# Patient Record
Sex: FEMALE | Race: WHITE | NOT HISPANIC OR LATINO
[De-identification: names, ages, dates, MRNs, and addresses within clinical notes are randomized per-mention and may not be internally consistent; named-entity substitution may affect disease eponyms.]

---

## 2017-11-15 PROBLEM — Z00.00 ENCOUNTER FOR PREVENTIVE HEALTH EXAMINATION: Status: ACTIVE | Noted: 2017-11-15

## 2018-01-09 PROBLEM — Z00.00 ENCOUNTER FOR PREVENTIVE HEALTH EXAMINATION: Noted: 2018-01-09

## 2018-02-27 ENCOUNTER — APPOINTMENT (OUTPATIENT)
Dept: OBGYN | Facility: CLINIC | Age: 28
End: 2018-02-27
Payer: COMMERCIAL

## 2018-02-27 VITALS
WEIGHT: 133.5 LBS | HEIGHT: 59 IN | SYSTOLIC BLOOD PRESSURE: 110 MMHG | DIASTOLIC BLOOD PRESSURE: 80 MMHG | BODY MASS INDEX: 26.91 KG/M2

## 2018-02-27 DIAGNOSIS — Z80.3 FAMILY HISTORY OF MALIGNANT NEOPLASM OF BREAST: ICD-10-CM

## 2018-02-27 PROCEDURE — 99202 OFFICE O/P NEW SF 15 MIN: CPT

## 2018-03-02 ENCOUNTER — TRANSCRIPTION ENCOUNTER (OUTPATIENT)
Age: 28
End: 2018-03-02

## 2018-06-28 ENCOUNTER — APPOINTMENT (OUTPATIENT)
Dept: OBGYN | Facility: CLINIC | Age: 28
End: 2018-06-28
Payer: COMMERCIAL

## 2018-06-28 PROCEDURE — 36415 COLL VENOUS BLD VENIPUNCTURE: CPT

## 2018-06-29 LAB
ABO + RH PNL BLD: NORMAL
BLD GP AB SCN SERPL QL: NORMAL
HCG SERPL-MCNC: 23 MIU/ML
PROGEST SERPL-MCNC: 14 NG/ML

## 2018-07-10 ENCOUNTER — APPOINTMENT (OUTPATIENT)
Dept: OBGYN | Facility: CLINIC | Age: 28
End: 2018-07-10
Payer: COMMERCIAL

## 2018-07-10 PROCEDURE — 36415 COLL VENOUS BLD VENIPUNCTURE: CPT

## 2018-07-11 LAB
HCG SERPL-MCNC: 6090 MIU/ML
PROGEST SERPL-MCNC: 15 NG/ML

## 2018-07-18 ENCOUNTER — APPOINTMENT (OUTPATIENT)
Dept: OBGYN | Facility: CLINIC | Age: 28
End: 2018-07-18
Payer: COMMERCIAL

## 2018-07-18 VITALS
BODY MASS INDEX: 30.67 KG/M2 | DIASTOLIC BLOOD PRESSURE: 60 MMHG | HEIGHT: 59 IN | SYSTOLIC BLOOD PRESSURE: 100 MMHG | WEIGHT: 152.13 LBS

## 2018-07-18 PROCEDURE — 36415 COLL VENOUS BLD VENIPUNCTURE: CPT

## 2018-07-18 PROCEDURE — 0502F SUBSEQUENT PRENATAL CARE: CPT

## 2018-07-19 LAB
C TRACH RRNA SPEC QL NAA+PROBE: NOT DETECTED
HCG SERPL-MCNC: ABNORMAL MIU/ML
N GONORRHOEA RRNA SPEC QL NAA+PROBE: NOT DETECTED
PROGEST SERPL-MCNC: 11.5 NG/ML
SOURCE AMPLIFICATION: NORMAL

## 2018-07-24 ENCOUNTER — APPOINTMENT (OUTPATIENT)
Dept: OBGYN | Facility: CLINIC | Age: 28
End: 2018-07-24
Payer: COMMERCIAL

## 2018-07-24 VITALS
HEIGHT: 59 IN | DIASTOLIC BLOOD PRESSURE: 70 MMHG | SYSTOLIC BLOOD PRESSURE: 110 MMHG | WEIGHT: 152.25 LBS | BODY MASS INDEX: 30.69 KG/M2

## 2018-07-24 LAB — PAP TEST: NORMAL

## 2018-07-24 PROCEDURE — 0501F PRENATAL FLOW SHEET: CPT

## 2018-07-24 PROCEDURE — 36415 COLL VENOUS BLD VENIPUNCTURE: CPT

## 2018-07-25 LAB
BASOPHILS # BLD AUTO: 0.02 K/UL
BASOPHILS NFR BLD AUTO: 0.2 %
CMV IGG SERPL QL: <0.2 U/ML
CMV IGG SERPL-IMP: NEGATIVE
EOSINOPHIL # BLD AUTO: 0.08 K/UL
EOSINOPHIL NFR BLD AUTO: 1 %
HBV SURFACE AG SER QL: NONREACTIVE
HCT VFR BLD CALC: 41.2 %
HCV AB SER QL: NONREACTIVE
HCV S/CO RATIO: 0.16 S/CO
HGB BLD-MCNC: 13.7 G/DL
HIV1+2 AB SPEC QL IA.RAPID: NONREACTIVE
HSV 1+2 IGG SER IA-IMP: NEGATIVE
HSV 1+2 IGG SER IA-IMP: POSITIVE
HSV1 IGG SER QL: 33.9 INDEX
HSV2 IGG SER QL: 0.11 INDEX
IMM GRANULOCYTES NFR BLD AUTO: 0.5 %
LYMPHOCYTES # BLD AUTO: 2.01 K/UL
LYMPHOCYTES NFR BLD AUTO: 24.2 %
MAN DIFF?: NORMAL
MCHC RBC-ENTMCNC: 30.2 PG
MCHC RBC-ENTMCNC: 33.3 GM/DL
MCV RBC AUTO: 90.7 FL
MEV IGG FLD QL IA: 117 AU/ML
MEV IGG+IGM SER-IMP: POSITIVE
MONOCYTES # BLD AUTO: 0.53 K/UL
MONOCYTES NFR BLD AUTO: 6.4 %
NEUTROPHILS # BLD AUTO: 5.62 K/UL
NEUTROPHILS NFR BLD AUTO: 67.7 %
PLATELET # BLD AUTO: 361 K/UL
RBC # BLD: 4.54 M/UL
RBC # FLD: 13.4 %
RUBV IGG FLD-ACNC: 6.6 INDEX
RUBV IGG SER-IMP: POSITIVE
T GONDII AB SER-IMP: NEGATIVE
T GONDII AB SER-IMP: NEGATIVE
T GONDII IGG SER QL: <3 IU/ML
T GONDII IGM SER QL: <3 AU/ML
T PALLIDUM AB SER QL IA: NEGATIVE
TSH SERPL-ACNC: 2.58 UIU/ML
VZV AB TITR SER: POSITIVE
VZV IGG SER IF-ACNC: 221.1 INDEX
WBC # FLD AUTO: 8.3 K/UL

## 2018-07-26 LAB
ABO + RH PNL BLD: NORMAL
BLD GP AB SCN SERPL QL: NORMAL
CMV IGM SERPL QL: <8 AU/ML
CMV IGM SERPL QL: NEGATIVE
HGB A MFR BLD: 97.2 %
HGB A2 MFR BLD: 2.8 %
HGB FRACT BLD-IMP: NORMAL
RUBV IGM FLD-ACNC: <20 AU/ML

## 2018-07-27 LAB
B19V IGG SER QL IA: 6.9 INDEX
B19V IGG+IGM SER-IMP: NORMAL
B19V IGG+IGM SER-IMP: POSITIVE
B19V IGM FLD-ACNC: 0.1 INDEX
B19V IGM SER-ACNC: NEGATIVE
HSV1 IGM SER QL: NORMAL TITER
HSV2 AB FLD-ACNC: NORMAL TITER
MEV IGM SER QL: NEGATIVE
VZV IGM SER IF-ACNC: <0.91 INDEX

## 2018-08-07 ENCOUNTER — APPOINTMENT (OUTPATIENT)
Dept: OBGYN | Facility: CLINIC | Age: 28
End: 2018-08-07
Payer: COMMERCIAL

## 2018-08-07 VITALS
DIASTOLIC BLOOD PRESSURE: 70 MMHG | HEIGHT: 59 IN | WEIGHT: 153 LBS | BODY MASS INDEX: 30.84 KG/M2 | SYSTOLIC BLOOD PRESSURE: 110 MMHG

## 2018-08-07 PROCEDURE — 0502F SUBSEQUENT PRENATAL CARE: CPT

## 2018-08-14 ENCOUNTER — APPOINTMENT (OUTPATIENT)
Dept: OBGYN | Facility: CLINIC | Age: 28
End: 2018-08-14
Payer: COMMERCIAL

## 2018-08-14 PROCEDURE — 0502F SUBSEQUENT PRENATAL CARE: CPT

## 2018-08-14 PROCEDURE — 36415 COLL VENOUS BLD VENIPUNCTURE: CPT

## 2018-08-20 ENCOUNTER — NON-APPOINTMENT (OUTPATIENT)
Age: 28
End: 2018-08-20

## 2018-08-22 LAB
CLARI ADDITIONAL INFO: NORMAL
CLARI CHROMOSOME 13: NORMAL
CLARI CHROMOSOME 18: NORMAL
CLARI CHROMOSOME 21: NORMAL
CLARI SEX CHROMOSOMES: NORMAL
CLARITEST NIPT: NORMAL

## 2018-09-06 ENCOUNTER — APPOINTMENT (OUTPATIENT)
Dept: OBGYN | Facility: CLINIC | Age: 28
End: 2018-09-06
Payer: COMMERCIAL

## 2018-09-06 VITALS
WEIGHT: 154.13 LBS | BODY MASS INDEX: 31.07 KG/M2 | SYSTOLIC BLOOD PRESSURE: 110 MMHG | HEIGHT: 59 IN | DIASTOLIC BLOOD PRESSURE: 70 MMHG

## 2018-09-06 PROCEDURE — 0502F SUBSEQUENT PRENATAL CARE: CPT

## 2018-09-20 ENCOUNTER — APPOINTMENT (OUTPATIENT)
Dept: OBGYN | Facility: CLINIC | Age: 28
End: 2018-09-20
Payer: COMMERCIAL

## 2018-09-20 VITALS
SYSTOLIC BLOOD PRESSURE: 100 MMHG | DIASTOLIC BLOOD PRESSURE: 60 MMHG | BODY MASS INDEX: 31.04 KG/M2 | WEIGHT: 154 LBS | HEIGHT: 59 IN

## 2018-09-20 PROCEDURE — 36415 COLL VENOUS BLD VENIPUNCTURE: CPT

## 2018-09-20 PROCEDURE — 0502F SUBSEQUENT PRENATAL CARE: CPT

## 2018-10-01 LAB
1ST TRIMESTER DATA: NORMAL
2ND TRIMESTER DATA: NORMAL
ADDENDUM DOC: NORMAL
AFP PNL SERPL: NORMAL
AFP SERPL-ACNC: NORMAL
AFP SERPL-ACNC: NORMAL
B-HCG FREE SERPL-MCNC: NORMAL
BASOPHILS # BLD AUTO: 0.02 K/UL
BASOPHILS NFR BLD AUTO: 0.2 %
CLINICAL BIOCHEMIST REVIEW: NORMAL
EOSINOPHIL # BLD AUTO: 0.15 K/UL
EOSINOPHIL NFR BLD AUTO: 1.7 %
FREE BETA HCG 1ST TRIMESTER: NORMAL
HCT VFR BLD CALC: 36 %
HGB BLD-MCNC: 11.4 G/DL
IMM GRANULOCYTES NFR BLD AUTO: 0.6 %
INHIBIN A SERPL-MCNC: NORMAL
LYMPHOCYTES # BLD AUTO: 2.35 K/UL
LYMPHOCYTES NFR BLD AUTO: 26.2 %
MAN DIFF?: NORMAL
MCHC RBC-ENTMCNC: 28.7 PG
MCHC RBC-ENTMCNC: 31.7 GM/DL
MCV RBC AUTO: 90.7 FL
MONOCYTES # BLD AUTO: 0.45 K/UL
MONOCYTES NFR BLD AUTO: 5 %
NASAL BONE: PRESENT
NEUTROPHILS # BLD AUTO: 5.94 K/UL
NEUTROPHILS NFR BLD AUTO: 66.3 %
NOTES NTD: NORMAL
NT: NORMAL
PAPP-A SERPL-ACNC: NORMAL
PLATELET # BLD AUTO: 359 K/UL
RBC # BLD: 3.97 M/UL
RBC # FLD: 13.1 %
U ESTRIOL SERPL-SCNC: NORMAL
WBC # FLD AUTO: 8.96 K/UL

## 2018-10-11 ENCOUNTER — APPOINTMENT (OUTPATIENT)
Dept: OBGYN | Facility: CLINIC | Age: 28
End: 2018-10-11
Payer: COMMERCIAL

## 2018-10-11 VITALS
DIASTOLIC BLOOD PRESSURE: 70 MMHG | HEIGHT: 59 IN | BODY MASS INDEX: 31.28 KG/M2 | WEIGHT: 155.13 LBS | SYSTOLIC BLOOD PRESSURE: 110 MMHG

## 2018-10-11 PROCEDURE — 0502F SUBSEQUENT PRENATAL CARE: CPT

## 2018-11-08 ENCOUNTER — APPOINTMENT (OUTPATIENT)
Dept: OBGYN | Facility: CLINIC | Age: 28
End: 2018-11-08
Payer: COMMERCIAL

## 2018-11-08 VITALS
SYSTOLIC BLOOD PRESSURE: 120 MMHG | WEIGHT: 160.25 LBS | BODY MASS INDEX: 32.31 KG/M2 | DIASTOLIC BLOOD PRESSURE: 70 MMHG | HEIGHT: 59 IN

## 2018-11-08 PROCEDURE — 0502F SUBSEQUENT PRENATAL CARE: CPT

## 2018-11-28 ENCOUNTER — NON-APPOINTMENT (OUTPATIENT)
Age: 28
End: 2018-11-28

## 2018-11-28 ENCOUNTER — APPOINTMENT (OUTPATIENT)
Dept: OBGYN | Facility: CLINIC | Age: 28
End: 2018-11-28
Payer: COMMERCIAL

## 2018-11-28 VITALS
WEIGHT: 161.13 LBS | SYSTOLIC BLOOD PRESSURE: 120 MMHG | BODY MASS INDEX: 32.48 KG/M2 | HEIGHT: 59 IN | DIASTOLIC BLOOD PRESSURE: 70 MMHG

## 2018-11-28 PROCEDURE — 36415 COLL VENOUS BLD VENIPUNCTURE: CPT

## 2018-11-28 PROCEDURE — 0502F SUBSEQUENT PRENATAL CARE: CPT

## 2018-11-29 LAB
BASOPHILS # BLD AUTO: 0.01 K/UL
BASOPHILS NFR BLD AUTO: 0.1 %
EOSINOPHIL # BLD AUTO: 0.08 K/UL
EOSINOPHIL NFR BLD AUTO: 0.8 %
GLUCOSE 1H P 50 G GLC PO SERPL-MCNC: 109 MG/DL
HBA1C MFR BLD HPLC: 5 %
HCT VFR BLD CALC: 32.7 %
HGB BLD-MCNC: 10.4 G/DL
IMM GRANULOCYTES NFR BLD AUTO: 1.2 %
LYMPHOCYTES # BLD AUTO: 1.62 K/UL
LYMPHOCYTES NFR BLD AUTO: 17.2 %
MAN DIFF?: NORMAL
MCHC RBC-ENTMCNC: 28.5 PG
MCHC RBC-ENTMCNC: 31.8 GM/DL
MCV RBC AUTO: 89.6 FL
MONOCYTES # BLD AUTO: 0.53 K/UL
MONOCYTES NFR BLD AUTO: 5.6 %
NEUTROPHILS # BLD AUTO: 7.07 K/UL
NEUTROPHILS NFR BLD AUTO: 75.1 %
PLATELET # BLD AUTO: 360 K/UL
RBC # BLD: 3.65 M/UL
RBC # FLD: 13 %
WBC # FLD AUTO: 9.42 K/UL

## 2018-11-30 LAB — BACTERIA UR CULT: NORMAL

## 2018-12-19 ENCOUNTER — NON-APPOINTMENT (OUTPATIENT)
Age: 28
End: 2018-12-19

## 2018-12-19 ENCOUNTER — APPOINTMENT (OUTPATIENT)
Dept: OBGYN | Facility: CLINIC | Age: 28
End: 2018-12-19
Payer: COMMERCIAL

## 2018-12-19 VITALS
BODY MASS INDEX: 32.66 KG/M2 | SYSTOLIC BLOOD PRESSURE: 110 MMHG | HEIGHT: 59 IN | WEIGHT: 162 LBS | DIASTOLIC BLOOD PRESSURE: 70 MMHG

## 2018-12-19 PROCEDURE — 0502F SUBSEQUENT PRENATAL CARE: CPT

## 2019-01-02 ENCOUNTER — NON-APPOINTMENT (OUTPATIENT)
Age: 29
End: 2019-01-02

## 2019-01-02 ENCOUNTER — APPOINTMENT (OUTPATIENT)
Dept: OBGYN | Facility: CLINIC | Age: 29
End: 2019-01-02
Payer: COMMERCIAL

## 2019-01-02 VITALS
BODY MASS INDEX: 33.26 KG/M2 | SYSTOLIC BLOOD PRESSURE: 100 MMHG | HEIGHT: 59 IN | DIASTOLIC BLOOD PRESSURE: 60 MMHG | WEIGHT: 165 LBS

## 2019-01-02 PROCEDURE — 0502F SUBSEQUENT PRENATAL CARE: CPT

## 2019-01-17 ENCOUNTER — APPOINTMENT (OUTPATIENT)
Dept: OBGYN | Facility: CLINIC | Age: 29
End: 2019-01-17
Payer: COMMERCIAL

## 2019-01-17 VITALS
WEIGHT: 165 LBS | HEIGHT: 59 IN | DIASTOLIC BLOOD PRESSURE: 70 MMHG | BODY MASS INDEX: 33.26 KG/M2 | SYSTOLIC BLOOD PRESSURE: 110 MMHG

## 2019-01-17 PROCEDURE — 0502F SUBSEQUENT PRENATAL CARE: CPT

## 2019-01-29 ENCOUNTER — APPOINTMENT (OUTPATIENT)
Dept: OBGYN | Facility: CLINIC | Age: 29
End: 2019-01-29
Payer: COMMERCIAL

## 2019-01-29 VITALS
DIASTOLIC BLOOD PRESSURE: 70 MMHG | WEIGHT: 166 LBS | BODY MASS INDEX: 33.47 KG/M2 | SYSTOLIC BLOOD PRESSURE: 120 MMHG | HEIGHT: 59 IN

## 2019-01-29 PROCEDURE — 0502F SUBSEQUENT PRENATAL CARE: CPT

## 2019-02-13 ENCOUNTER — APPOINTMENT (OUTPATIENT)
Dept: OBGYN | Facility: CLINIC | Age: 29
End: 2019-02-13
Payer: COMMERCIAL

## 2019-02-13 ENCOUNTER — NON-APPOINTMENT (OUTPATIENT)
Age: 29
End: 2019-02-13

## 2019-02-13 VITALS
DIASTOLIC BLOOD PRESSURE: 80 MMHG | HEIGHT: 59 IN | SYSTOLIC BLOOD PRESSURE: 110 MMHG | BODY MASS INDEX: 34.07 KG/M2 | WEIGHT: 169 LBS

## 2019-02-13 PROCEDURE — 0502F SUBSEQUENT PRENATAL CARE: CPT

## 2019-02-15 LAB
BACTERIA UR CULT: NORMAL
GP B STREP DNA SPEC QL NAA+PROBE: NORMAL
GP B STREP DNA SPEC QL NAA+PROBE: NOT DETECTED
SOURCE GBS: NORMAL

## 2019-02-19 ENCOUNTER — APPOINTMENT (OUTPATIENT)
Dept: OBGYN | Facility: CLINIC | Age: 29
End: 2019-02-19
Payer: COMMERCIAL

## 2019-02-19 VITALS
HEIGHT: 59 IN | DIASTOLIC BLOOD PRESSURE: 80 MMHG | SYSTOLIC BLOOD PRESSURE: 110 MMHG | WEIGHT: 169 LBS | BODY MASS INDEX: 34.07 KG/M2

## 2019-02-19 PROCEDURE — 0502F SUBSEQUENT PRENATAL CARE: CPT

## 2019-02-24 ENCOUNTER — OUTPATIENT (OUTPATIENT)
Dept: OUTPATIENT SERVICES | Facility: HOSPITAL | Age: 29
LOS: 1 days | End: 2019-02-24
Payer: COMMERCIAL

## 2019-02-24 DIAGNOSIS — Z3A.00 WEEKS OF GESTATION OF PREGNANCY NOT SPECIFIED: ICD-10-CM

## 2019-02-24 DIAGNOSIS — O26.899 OTHER SPECIFIED PREGNANCY RELATED CONDITIONS, UNSPECIFIED TRIMESTER: ICD-10-CM

## 2019-02-26 ENCOUNTER — APPOINTMENT (OUTPATIENT)
Dept: OBGYN | Facility: CLINIC | Age: 29
End: 2019-02-26
Payer: COMMERCIAL

## 2019-02-26 VITALS
DIASTOLIC BLOOD PRESSURE: 70 MMHG | SYSTOLIC BLOOD PRESSURE: 110 MMHG | BODY MASS INDEX: 34.47 KG/M2 | HEIGHT: 59 IN | WEIGHT: 171 LBS

## 2019-02-26 PROCEDURE — 0502F SUBSEQUENT PRENATAL CARE: CPT

## 2019-02-28 ENCOUNTER — INPATIENT (INPATIENT)
Facility: HOSPITAL | Age: 29
LOS: 2 days | Discharge: HOME | End: 2019-03-03
Attending: OBSTETRICS & GYNECOLOGY | Admitting: OBSTETRICS & GYNECOLOGY
Payer: COMMERCIAL

## 2019-02-28 ENCOUNTER — RESULT REVIEW (OUTPATIENT)
Age: 29
End: 2019-02-28

## 2019-02-28 VITALS — WEIGHT: 168.65 LBS | HEIGHT: 58 IN

## 2019-02-28 DIAGNOSIS — O26.899 OTHER SPECIFIED PREGNANCY RELATED CONDITIONS, UNSPECIFIED TRIMESTER: ICD-10-CM

## 2019-02-28 DIAGNOSIS — Z3A.00 WEEKS OF GESTATION OF PREGNANCY NOT SPECIFIED: ICD-10-CM

## 2019-02-28 DIAGNOSIS — O09.613 SUPERVISION OF YOUNG PRIMIGRAVIDA, THIRD TRIMESTER: ICD-10-CM

## 2019-02-28 LAB
BASOPHILS # BLD AUTO: 0.03 K/UL — SIGNIFICANT CHANGE UP (ref 0–0.2)
BASOPHILS NFR BLD AUTO: 0.3 % — SIGNIFICANT CHANGE UP (ref 0–2)
BLD GP AB SCN SERPL QL: NEGATIVE — SIGNIFICANT CHANGE UP
EOSINOPHIL # BLD AUTO: 0.12 K/UL — SIGNIFICANT CHANGE UP (ref 0–0.5)
EOSINOPHIL NFR BLD AUTO: 1 % — SIGNIFICANT CHANGE UP (ref 0–6)
HCT VFR BLD CALC: 37 % — SIGNIFICANT CHANGE UP (ref 34.5–45)
HGB BLD-MCNC: 12.5 G/DL — SIGNIFICANT CHANGE UP (ref 11.5–15.5)
IMM GRANULOCYTES NFR BLD AUTO: 1.9 % — HIGH (ref 0–1.5)
LYMPHOCYTES # BLD AUTO: 2.35 K/UL — SIGNIFICANT CHANGE UP (ref 1–3.3)
LYMPHOCYTES # BLD AUTO: 20.1 % — SIGNIFICANT CHANGE UP (ref 13–44)
MCHC RBC-ENTMCNC: 29.3 PG — SIGNIFICANT CHANGE UP (ref 27–34)
MCHC RBC-ENTMCNC: 33.8 GM/DL — SIGNIFICANT CHANGE UP (ref 32–36)
MCV RBC AUTO: 86.7 FL — SIGNIFICANT CHANGE UP (ref 80–100)
MONOCYTES # BLD AUTO: 0.88 K/UL — SIGNIFICANT CHANGE UP (ref 0–0.9)
MONOCYTES NFR BLD AUTO: 7.5 % — SIGNIFICANT CHANGE UP (ref 2–14)
NEUTROPHILS # BLD AUTO: 8.07 K/UL — HIGH (ref 1.8–7.4)
NEUTROPHILS NFR BLD AUTO: 69.2 % — SIGNIFICANT CHANGE UP (ref 43–77)
NRBC # BLD: 0 /100 WBCS — SIGNIFICANT CHANGE UP (ref 0–0)
PLATELET # BLD AUTO: 332 K/UL — SIGNIFICANT CHANGE UP (ref 150–400)
RBC # BLD: 4.27 M/UL — SIGNIFICANT CHANGE UP (ref 3.8–5.2)
RBC # FLD: 13.8 % — SIGNIFICANT CHANGE UP (ref 10.3–14.5)
RH IG SCN BLD-IMP: POSITIVE — SIGNIFICANT CHANGE UP
T PALLIDUM AB TITR SER: NEGATIVE — SIGNIFICANT CHANGE UP
WBC # BLD: 11.67 K/UL — HIGH (ref 3.8–10.5)
WBC # FLD AUTO: 11.67 K/UL — HIGH (ref 3.8–10.5)

## 2019-02-28 PROCEDURE — 59510 CESAREAN DELIVERY: CPT

## 2019-02-28 RX ORDER — OXYTOCIN 10 UNIT/ML
333.33 VIAL (ML) INJECTION
Qty: 20 | Refills: 0 | Status: DISCONTINUED | OUTPATIENT
Start: 2019-03-01 | End: 2019-03-03

## 2019-02-28 RX ORDER — GLYCERIN ADULT
1 SUPPOSITORY, RECTAL RECTAL AT BEDTIME
Refills: 0 | Status: DISCONTINUED | OUTPATIENT
Start: 2019-03-01 | End: 2019-03-03

## 2019-02-28 RX ORDER — LANOLIN
1 OINTMENT (GRAM) TOPICAL
Refills: 0 | Status: DISCONTINUED | OUTPATIENT
Start: 2019-03-01 | End: 2019-03-03

## 2019-02-28 RX ORDER — HEPARIN SODIUM 5000 [USP'U]/ML
5000 INJECTION INTRAVENOUS; SUBCUTANEOUS EVERY 12 HOURS
Refills: 0 | Status: DISCONTINUED | OUTPATIENT
Start: 2019-03-01 | End: 2019-03-03

## 2019-02-28 RX ORDER — DIPHENHYDRAMINE HCL 50 MG
25 CAPSULE ORAL EVERY 6 HOURS
Refills: 0 | Status: DISCONTINUED | OUTPATIENT
Start: 2019-03-01 | End: 2019-03-03

## 2019-02-28 RX ORDER — NALOXONE HYDROCHLORIDE 4 MG/.1ML
0.1 SPRAY NASAL
Refills: 0 | Status: DISCONTINUED | OUTPATIENT
Start: 2019-03-01 | End: 2019-03-03

## 2019-02-28 RX ORDER — ONDANSETRON 8 MG/1
4 TABLET, FILM COATED ORAL EVERY 6 HOURS
Refills: 0 | Status: DISCONTINUED | OUTPATIENT
Start: 2019-03-01 | End: 2019-03-01

## 2019-02-28 RX ORDER — SODIUM CHLORIDE 9 MG/ML
1000 INJECTION, SOLUTION INTRAVENOUS
Refills: 0 | Status: DISCONTINUED | OUTPATIENT
Start: 2019-03-01 | End: 2019-03-03

## 2019-02-28 RX ORDER — TETANUS TOXOID, REDUCED DIPHTHERIA TOXOID AND ACELLULAR PERTUSSIS VACCINE, ADSORBED 5; 2.5; 8; 8; 2.5 [IU]/.5ML; [IU]/.5ML; UG/.5ML; UG/.5ML; UG/.5ML
0.5 SUSPENSION INTRAMUSCULAR ONCE
Refills: 0 | Status: COMPLETED | OUTPATIENT
Start: 2019-03-01

## 2019-02-28 RX ORDER — ACETAMINOPHEN 500 MG
650 TABLET ORAL EVERY 6 HOURS
Refills: 0 | Status: DISCONTINUED | OUTPATIENT
Start: 2019-03-01 | End: 2019-03-03

## 2019-02-28 RX ORDER — CITRIC ACID/SODIUM CITRATE 300-500 MG
15 SOLUTION, ORAL ORAL EVERY 4 HOURS
Refills: 0 | Status: DISCONTINUED | OUTPATIENT
Start: 2019-02-28 | End: 2019-02-28

## 2019-02-28 RX ORDER — AZITHROMYCIN 500 MG/1
500 TABLET, FILM COATED ORAL ONCE
Refills: 0 | Status: DISCONTINUED | OUTPATIENT
Start: 2019-02-28 | End: 2019-02-28

## 2019-02-28 RX ORDER — OXYTOCIN 10 UNIT/ML
333.33 VIAL (ML) INJECTION
Qty: 20 | Refills: 0 | Status: DISCONTINUED | OUTPATIENT
Start: 2019-02-28 | End: 2019-02-28

## 2019-02-28 RX ORDER — OXYCODONE AND ACETAMINOPHEN 5; 325 MG/1; MG/1
1 TABLET ORAL
Refills: 0 | Status: DISCONTINUED | OUTPATIENT
Start: 2019-03-01 | End: 2019-03-03

## 2019-02-28 RX ORDER — SODIUM CHLORIDE 9 MG/ML
1000 INJECTION, SOLUTION INTRAVENOUS ONCE
Refills: 0 | Status: DISCONTINUED | OUTPATIENT
Start: 2019-02-28 | End: 2019-02-28

## 2019-02-28 RX ORDER — SODIUM CHLORIDE 9 MG/ML
1000 INJECTION, SOLUTION INTRAVENOUS
Refills: 0 | Status: DISCONTINUED | OUTPATIENT
Start: 2019-02-28 | End: 2019-02-28

## 2019-02-28 RX ORDER — KETOROLAC TROMETHAMINE 30 MG/ML
15 SYRINGE (ML) INJECTION ONCE
Refills: 0 | Status: DISCONTINUED | OUTPATIENT
Start: 2019-03-01 | End: 2019-03-03

## 2019-02-28 RX ORDER — HYDROMORPHONE HYDROCHLORIDE 2 MG/ML
1 INJECTION INTRAMUSCULAR; INTRAVENOUS; SUBCUTANEOUS ONCE
Refills: 0 | Status: DISCONTINUED | OUTPATIENT
Start: 2019-03-01 | End: 2019-03-03

## 2019-02-28 RX ORDER — CEFAZOLIN SODIUM 1 G
2000 VIAL (EA) INJECTION ONCE
Refills: 0 | Status: COMPLETED | OUTPATIENT
Start: 2019-02-28 | End: 2019-02-28

## 2019-02-28 RX ORDER — OXYTOCIN 10 UNIT/ML
1 VIAL (ML) INJECTION
Qty: 30 | Refills: 0 | Status: DISCONTINUED | OUTPATIENT
Start: 2019-02-28 | End: 2019-02-28

## 2019-02-28 RX ORDER — OXYTOCIN 10 UNIT/ML
41.67 VIAL (ML) INJECTION
Qty: 20 | Refills: 0 | Status: DISCONTINUED | OUTPATIENT
Start: 2019-03-01 | End: 2019-03-03

## 2019-02-28 RX ORDER — SIMETHICONE 80 MG/1
80 TABLET, CHEWABLE ORAL EVERY 4 HOURS
Refills: 0 | Status: DISCONTINUED | OUTPATIENT
Start: 2019-03-01 | End: 2019-03-03

## 2019-02-28 RX ORDER — FERROUS SULFATE 325(65) MG
325 TABLET ORAL DAILY
Refills: 0 | Status: DISCONTINUED | OUTPATIENT
Start: 2019-03-01 | End: 2019-03-03

## 2019-02-28 RX ORDER — DOCUSATE SODIUM 100 MG
100 CAPSULE ORAL
Refills: 0 | Status: DISCONTINUED | OUTPATIENT
Start: 2019-03-01 | End: 2019-03-03

## 2019-02-28 RX ORDER — IBUPROFEN 200 MG
600 TABLET ORAL EVERY 6 HOURS
Refills: 0 | Status: DISCONTINUED | OUTPATIENT
Start: 2019-03-01 | End: 2019-03-03

## 2019-02-28 RX ORDER — OXYCODONE AND ACETAMINOPHEN 5; 325 MG/1; MG/1
2 TABLET ORAL EVERY 6 HOURS
Refills: 0 | Status: DISCONTINUED | OUTPATIENT
Start: 2019-03-01 | End: 2019-03-03

## 2019-02-28 RX ORDER — FENTANYL/BUPIVACAINE/NS/PF 2MCG/ML-.1
250 PLASTIC BAG, INJECTION (ML) INJECTION
Refills: 0 | Status: DISCONTINUED | OUTPATIENT
Start: 2019-02-28 | End: 2019-02-28

## 2019-02-28 RX ORDER — ONDANSETRON 8 MG/1
4 TABLET, FILM COATED ORAL EVERY 6 HOURS
Refills: 0 | Status: DISCONTINUED | OUTPATIENT
Start: 2019-03-01 | End: 2019-03-03

## 2019-02-28 RX ADMIN — Medication 15 MILLILITER(S): at 19:14

## 2019-02-28 RX ADMIN — Medication 100 MILLIGRAM(S): at 19:14

## 2019-02-28 RX ADMIN — Medication 1 MILLIUNIT(S)/MIN: at 08:56

## 2019-02-28 RX ADMIN — SODIUM CHLORIDE 125 MILLILITER(S): 9 INJECTION, SOLUTION INTRAVENOUS at 08:56

## 2019-03-01 LAB
HCT VFR BLD CALC: 30.1 % — LOW (ref 34.5–45)
HGB BLD-MCNC: 10 G/DL — LOW (ref 11.5–15.5)
MCHC RBC-ENTMCNC: 29.2 PG — SIGNIFICANT CHANGE UP (ref 27–34)
MCHC RBC-ENTMCNC: 33.2 GM/DL — SIGNIFICANT CHANGE UP (ref 32–36)
MCV RBC AUTO: 88 FL — SIGNIFICANT CHANGE UP (ref 80–100)
NRBC # BLD: 0 /100 WBCS — SIGNIFICANT CHANGE UP (ref 0–0)
PLATELET # BLD AUTO: 268 K/UL — SIGNIFICANT CHANGE UP (ref 150–400)
RBC # BLD: 3.42 M/UL — LOW (ref 3.8–5.2)
RBC # FLD: 13.8 % — SIGNIFICANT CHANGE UP (ref 10.3–14.5)
WBC # BLD: 20.43 K/UL — HIGH (ref 3.8–10.5)
WBC # FLD AUTO: 20.43 K/UL — HIGH (ref 3.8–10.5)

## 2019-03-01 RX ORDER — ALBUTEROL 90 UG/1
2 AEROSOL, METERED ORAL EVERY 6 HOURS
Refills: 0 | Status: DISCONTINUED | OUTPATIENT
Start: 2019-03-01 | End: 2019-03-01

## 2019-03-01 RX ORDER — ALBUTEROL 90 UG/1
2 AEROSOL, METERED ORAL EVERY 6 HOURS
Refills: 0 | Status: DISCONTINUED | OUTPATIENT
Start: 2019-03-01 | End: 2019-03-03

## 2019-03-01 RX ADMIN — SIMETHICONE 80 MILLIGRAM(S): 80 TABLET, CHEWABLE ORAL at 12:31

## 2019-03-01 RX ADMIN — SIMETHICONE 80 MILLIGRAM(S): 80 TABLET, CHEWABLE ORAL at 06:44

## 2019-03-01 RX ADMIN — HEPARIN SODIUM 5000 UNIT(S): 5000 INJECTION INTRAVENOUS; SUBCUTANEOUS at 12:30

## 2019-03-01 RX ADMIN — ALBUTEROL 2 PUFF(S): 90 AEROSOL, METERED ORAL at 18:39

## 2019-03-01 RX ADMIN — Medication 600 MILLIGRAM(S): at 12:31

## 2019-03-01 RX ADMIN — Medication 600 MILLIGRAM(S): at 06:44

## 2019-03-01 RX ADMIN — SIMETHICONE 80 MILLIGRAM(S): 80 TABLET, CHEWABLE ORAL at 22:32

## 2019-03-01 RX ADMIN — Medication 1 TABLET(S): at 12:31

## 2019-03-01 RX ADMIN — Medication 100 MILLIGRAM(S): at 12:31

## 2019-03-01 RX ADMIN — Medication 650 MILLIGRAM(S): at 23:00

## 2019-03-01 RX ADMIN — Medication 650 MILLIGRAM(S): at 22:27

## 2019-03-01 RX ADMIN — Medication 100 MILLIGRAM(S): at 06:44

## 2019-03-01 RX ADMIN — Medication 325 MILLIGRAM(S): at 12:42

## 2019-03-01 RX ADMIN — SIMETHICONE 80 MILLIGRAM(S): 80 TABLET, CHEWABLE ORAL at 17:40

## 2019-03-01 RX ADMIN — Medication 600 MILLIGRAM(S): at 13:00

## 2019-03-01 RX ADMIN — Medication 600 MILLIGRAM(S): at 07:52

## 2019-03-01 RX ADMIN — Medication 600 MILLIGRAM(S): at 17:41

## 2019-03-01 NOTE — LACTATION INITIAL EVALUATION - PRO FEM REPRO BREAST PUMP YN
advised mother to pump 15-20min q2-3h due to formula supplementation and h/o breast reduction surgery/yes

## 2019-03-01 NOTE — PROVIDER CONTACT NOTE (OTHER) - ASSESSMENT
pt Respirations  = 22; pt felt better with incentive spirometer; symptom reappeared half an hour after incentive spirometer completed

## 2019-03-01 NOTE — LACTATION INITIAL EVALUATION - NS LACT CON REASON FOR REQ
16hr old  2stools/DTV 2.9% wt loss. baby was formula fed overnight and this morning however mother states she wishes to breastfeed. mother will be putting baby to breast for the first time. placed baby s2s and noted early feeding cues. assisted mom with a football hold. with full assist baby sustained a deep latch on R breast for approx 5minutes with slow, rhythmic suck and visible swallowing. mom initially c/o intense stretching which she verbalized discomfort with but after a few minutes, she said it no longer felt uncomfortable. offered L breast, baby was sleepy and did not attach right away. mom stated she wanted to stop trying. mom has h/o breast reduction surgery with lollipop incision. reports nipple sensitivity and significant breast growth in pregnancy. colostrum easily expressible from both sides. discussed pumping for stimulation 15-20min q2-3h, pump at bedside primary nurse to provided instruction. provided with [email or web address] as a resource and advised f/u with ibclc. also discussed herbal galactogogues. reviewed bfing basics, positioning techniques, feeding/satiety cues, signs of good latch, and encouraged s2s contact. taught hand expression with return demo. advised responsive feeding 8-12x/day./primaparous mom

## 2019-03-01 NOTE — PROGRESS NOTE ADULT - ASSESSMENT
28y Female POD# 1   s/p C/S, Uncomplicated                                       1. Neuro/Pain:  OPM  2  CV:  VS per routine  3. Pulm: Encourage ISS & Ambulation  4. GI:  Reg  5. : TOV  6. DVT ppx: SCDs, SQH 5000 mg BID  7. Dispo: POD #3 or #4

## 2019-03-01 NOTE — PROGRESS NOTE ADULT - SUBJECTIVE AND OBJECTIVE BOX
Patient evaluated at bedside.   She reports pain is well controlled.  Campo in place  No Flatus  Not OOB yet.    She denies HA, dizziness, CP, palpitations, SOB, n/v, or heavy vaginal bleeding.    Physical Exam:  Vital Signs Last 24 Hrs  T(C): 37.1 (01 Mar 2019 06:00), Max: 37.1 (01 Mar 2019 06:00)  T(F): 98.7 (01 Mar 2019 06:00), Max: 98.7 (01 Mar 2019 06:00)  HR: 71 (01 Mar 2019 06:00) (71 - 95)  BP: 93/58 (01 Mar 2019 06:00) (93/58 - 119/57)  BP(mean): --  RR: 17 (01 Mar 2019 06:00) (16 - 18)  SpO2: 97% (01 Mar 2019 06:00) (95% - 98%)    Gen: NAD  Abd: + BS, soft, nontender, nondistended, no rebound or guarding  Incision clean, dry and intact  uterus firm at midline  : lochia WNL  Extremities: no swelling or calf tenderness                          10.0   20.43 )-----------( 268      ( 01 Mar 2019 06:09 )             30.1     MEDICATIONS  (STANDING):  diphtheria/tetanus/pertussis (acellular) Vaccine (ADAcel) 0.5 milliLiter(s) IntraMuscular once  ferrous    sulfate 325 milliGRAM(s) Oral daily  heparin  Injectable 5000 Unit(s) SubCutaneous every 12 hours  ibuprofen  Tablet. 600 milliGRAM(s) Oral every 6 hours  ketorolac   Injectable 15 milliGRAM(s) IV Push once  lactated ringers. 1000 milliLiter(s) (125 mL/Hr) IV Continuous <Continuous>  lactated ringers. 1000 milliLiter(s) (125 mL/Hr) IV Continuous <Continuous>  oxytocin Infusion 41.667 milliUNIT(s)/Min (125 mL/Hr) IV Continuous <Continuous>  oxytocin Infusion 333.333 milliUNIT(s)/Min (1000 mL/Hr) IV Continuous <Continuous>  oxytocin Infusion 41.667 milliUNIT(s)/Min (125 mL/Hr) IV Continuous <Continuous>  prenatal multivitamin 1 Tablet(s) Oral daily    MEDICATIONS  (PRN):  acetaminophen   Tablet .. 650 milliGRAM(s) Oral every 6 hours PRN Temp greater or equal to 38.5C (101.3F), Mild Pain (1 - 3)  diphenhydrAMINE 25 milliGRAM(s) Oral every 6 hours PRN Itching  docusate sodium 100 milliGRAM(s) Oral two times a day PRN Stool Softening  glycerin Suppository - Adult 1 Suppository(s) Rectal at bedtime PRN Constipation  HYDROmorphone  Injectable 1 milliGRAM(s) IV Push once PRN Severe Pain (7 - 10)  lanolin Ointment 1 Application(s) Topical every 3 hours PRN Sore Nipples  naloxone Injectable 0.1 milliGRAM(s) IV Push every 3 minutes PRN For ANY of the following changes in patient status:  A. RR LESS THAN 10 breaths per minute, B. Oxygen saturation LESS THAN 90%, C. Sedation score of 6  naloxone Injectable 0.1 milliGRAM(s) IV Push every 3 minutes PRN For ANY of the following changes in patient status:  A. RR LESS THAN 10 breaths per minute, B. Oxygen saturation LESS THAN 90%, C. Sedation score of 6  ondansetron Injectable 4 milliGRAM(s) IV Push every 6 hours PRN Nausea  oxyCODONE    5 mG/acetaminophen 325 mG 1 Tablet(s) Oral every 3 hours PRN Moderate Pain (4 - 6)  oxyCODONE    5 mG/acetaminophen 325 mG 2 Tablet(s) Oral every 6 hours PRN Severe Pain (7 - 10)  simethicone 80 milliGRAM(s) Chew every 4 hours PRN Gas

## 2019-03-02 RX ADMIN — Medication 600 MILLIGRAM(S): at 05:56

## 2019-03-02 RX ADMIN — Medication 100 MILLIGRAM(S): at 00:20

## 2019-03-02 RX ADMIN — Medication 100 MILLIGRAM(S): at 11:26

## 2019-03-02 RX ADMIN — OXYCODONE AND ACETAMINOPHEN 2 TABLET(S): 5; 325 TABLET ORAL at 11:26

## 2019-03-02 RX ADMIN — Medication 600 MILLIGRAM(S): at 00:20

## 2019-03-02 RX ADMIN — Medication 1 TABLET(S): at 11:25

## 2019-03-02 RX ADMIN — Medication 600 MILLIGRAM(S): at 06:48

## 2019-03-02 RX ADMIN — Medication 600 MILLIGRAM(S): at 15:56

## 2019-03-02 RX ADMIN — Medication 600 MILLIGRAM(S): at 01:00

## 2019-03-02 RX ADMIN — Medication 650 MILLIGRAM(S): at 18:40

## 2019-03-02 RX ADMIN — Medication 650 MILLIGRAM(S): at 18:41

## 2019-03-02 RX ADMIN — OXYCODONE AND ACETAMINOPHEN 2 TABLET(S): 5; 325 TABLET ORAL at 04:55

## 2019-03-02 RX ADMIN — HEPARIN SODIUM 5000 UNIT(S): 5000 INJECTION INTRAVENOUS; SUBCUTANEOUS at 11:27

## 2019-03-02 RX ADMIN — Medication 600 MILLIGRAM(S): at 16:00

## 2019-03-02 RX ADMIN — OXYCODONE AND ACETAMINOPHEN 2 TABLET(S): 5; 325 TABLET ORAL at 23:14

## 2019-03-02 RX ADMIN — ONDANSETRON 4 MILLIGRAM(S): 8 TABLET, FILM COATED ORAL at 03:51

## 2019-03-02 RX ADMIN — OXYCODONE AND ACETAMINOPHEN 2 TABLET(S): 5; 325 TABLET ORAL at 04:24

## 2019-03-02 RX ADMIN — HEPARIN SODIUM 5000 UNIT(S): 5000 INJECTION INTRAVENOUS; SUBCUTANEOUS at 00:20

## 2019-03-02 RX ADMIN — Medication 325 MILLIGRAM(S): at 11:25

## 2019-03-02 NOTE — PROGRESS NOTE ADULT - SUBJECTIVE AND OBJECTIVE BOX
Patient evaluated at bedside.   She reports pain is well controlled with motrin and tyleonl  She has been ambulating without assistance, voiding spontaneously, passing gas, tolerating regular diet and is breastfeeding.    She denies HA, dizziness, CP, palpitations, SOB, n/v, or heavy vaginal bleeding.    Physical Exam:  Vital Signs Last 24 Hrs  T(C): 36.7 (02 Mar 2019 02:00), Max: 36.7 (01 Mar 2019 10:00)  T(F): 98.1 (02 Mar 2019 02:00), Max: 98.1 (01 Mar 2019 10:00)  HR: 80 (02 Mar 2019 02:00) (80 - 90)  BP: 95/61 (02 Mar 2019 02:00) (89/60 - 103/68)  BP(mean): --  RR: 17 (02 Mar 2019 02:00) (17 - 18)  SpO2: 97% (02 Mar 2019 02:00) (97% - 100%)    Gen: NAD  Abd: + BS, soft, nontender, nondistended, no rebound or guarding  Incision clean, dry and intact  uterus firm at midline  : lochia WNL  Extremities: no swelling or calf tenderness                          10.0   20.43 )-----------( 268      ( 01 Mar 2019 06:09 )             30.1     MEDICATIONS  (STANDING):  diphtheria/tetanus/pertussis (acellular) Vaccine (ADAcel) 0.5 milliLiter(s) IntraMuscular once  ferrous    sulfate 325 milliGRAM(s) Oral daily  heparin  Injectable 5000 Unit(s) SubCutaneous every 12 hours  ibuprofen  Tablet. 600 milliGRAM(s) Oral every 6 hours  ketorolac   Injectable 15 milliGRAM(s) IV Push once  lactated ringers. 1000 milliLiter(s) (125 mL/Hr) IV Continuous <Continuous>  lactated ringers. 1000 milliLiter(s) (125 mL/Hr) IV Continuous <Continuous>  oxytocin Infusion 41.667 milliUNIT(s)/Min (125 mL/Hr) IV Continuous <Continuous>  oxytocin Infusion 333.333 milliUNIT(s)/Min (1000 mL/Hr) IV Continuous <Continuous>  oxytocin Infusion 41.667 milliUNIT(s)/Min (125 mL/Hr) IV Continuous <Continuous>  prenatal multivitamin 1 Tablet(s) Oral daily    MEDICATIONS  (PRN):  acetaminophen   Tablet .. 650 milliGRAM(s) Oral every 6 hours PRN Temp greater or equal to 38.5C (101.3F), Mild Pain (1 - 3)  ALBUTerol    90 MICROgram(s) HFA Inhaler 2 Puff(s) Inhalation every 6 hours PRN Shortness of Breath and/or Wheezing  diphenhydrAMINE 25 milliGRAM(s) Oral every 6 hours PRN Itching  docusate sodium 100 milliGRAM(s) Oral two times a day PRN Stool Softening  glycerin Suppository - Adult 1 Suppository(s) Rectal at bedtime PRN Constipation  HYDROmorphone  Injectable 1 milliGRAM(s) IV Push once PRN Severe Pain (7 - 10)  lanolin Ointment 1 Application(s) Topical every 3 hours PRN Sore Nipples  naloxone Injectable 0.1 milliGRAM(s) IV Push every 3 minutes PRN For ANY of the following changes in patient status:  A. RR LESS THAN 10 breaths per minute, B. Oxygen saturation LESS THAN 90%, C. Sedation score of 6  naloxone Injectable 0.1 milliGRAM(s) IV Push every 3 minutes PRN For ANY of the following changes in patient status:  A. RR LESS THAN 10 breaths per minute, B. Oxygen saturation LESS THAN 90%, C. Sedation score of 6  ondansetron Injectable 4 milliGRAM(s) IV Push every 6 hours PRN Nausea  oxyCODONE    5 mG/acetaminophen 325 mG 1 Tablet(s) Oral every 3 hours PRN Moderate Pain (4 - 6)  oxyCODONE    5 mG/acetaminophen 325 mG 2 Tablet(s) Oral every 6 hours PRN Severe Pain (7 - 10)  simethicone 80 milliGRAM(s) Chew every 4 hours PRN Gas

## 2019-03-02 NOTE — PROGRESS NOTE ADULT - ASSESSMENT
28y Female POD# 2   s/p C/S, Uncomplicated                                       1. Neuro/Pain:  OPM  2  CV:  VS per routine  3. Pulm: Encourage ISS & Ambulation  4. GI:  Reg  5. : Voiding  6. DVT ppx: SCDs, SQH 5000 mg BID  7. Dispo: POD #3 or #4

## 2019-03-03 ENCOUNTER — TRANSCRIPTION ENCOUNTER (OUTPATIENT)
Age: 29
End: 2019-03-03

## 2019-03-03 VITALS
TEMPERATURE: 98 F | HEART RATE: 88 BPM | RESPIRATION RATE: 18 BRPM | OXYGEN SATURATION: 99 % | SYSTOLIC BLOOD PRESSURE: 105 MMHG | DIASTOLIC BLOOD PRESSURE: 73 MMHG

## 2019-03-03 RX ORDER — TETANUS TOXOID, REDUCED DIPHTHERIA TOXOID AND ACELLULAR PERTUSSIS VACCINE, ADSORBED 5; 2.5; 8; 8; 2.5 [IU]/.5ML; [IU]/.5ML; UG/.5ML; UG/.5ML; UG/.5ML
0.5 SUSPENSION INTRAMUSCULAR ONCE
Refills: 0 | Status: COMPLETED | OUTPATIENT
Start: 2019-03-03 | End: 2019-03-03

## 2019-03-03 RX ADMIN — Medication 600 MILLIGRAM(S): at 02:42

## 2019-03-03 RX ADMIN — HEPARIN SODIUM 5000 UNIT(S): 5000 INJECTION INTRAVENOUS; SUBCUTANEOUS at 12:43

## 2019-03-03 RX ADMIN — Medication 325 MILLIGRAM(S): at 12:43

## 2019-03-03 RX ADMIN — HEPARIN SODIUM 5000 UNIT(S): 5000 INJECTION INTRAVENOUS; SUBCUTANEOUS at 02:43

## 2019-03-03 RX ADMIN — Medication 1 TABLET(S): at 12:43

## 2019-03-03 RX ADMIN — Medication 600 MILLIGRAM(S): at 03:14

## 2019-03-03 RX ADMIN — Medication 600 MILLIGRAM(S): at 12:43

## 2019-03-03 RX ADMIN — OXYCODONE AND ACETAMINOPHEN 2 TABLET(S): 5; 325 TABLET ORAL at 00:00

## 2019-03-03 RX ADMIN — Medication 650 MILLIGRAM(S): at 06:42

## 2019-03-03 RX ADMIN — TETANUS TOXOID, REDUCED DIPHTHERIA TOXOID AND ACELLULAR PERTUSSIS VACCINE, ADSORBED 0.5 MILLILITER(S): 5; 2.5; 8; 8; 2.5 SUSPENSION INTRAMUSCULAR at 12:47

## 2019-03-03 NOTE — PROGRESS NOTE ADULT - ASSESSMENT
28y Female POD#3  s/p C/S, Uncomplicated                                     PostOp HB - 10    1. Neuro/Pain:  OPM  2  CV:  VS per routine  3. Pulm: Encourage ISS & Ambulation  4. GI:  Reg  5. : Voiding  6. DVT ppx: SCDs, SQH 5000 mg BID  7. Dispo: POD #3 or #4

## 2019-03-03 NOTE — DISCHARGE NOTE OB - CARE PROVIDER_API CALL
Dickson Celestin)  Obstetrics and Gynecology  [address], [facility] [place] [place] [address]  Phone: [phone]  Fax: [phone]  Follow Up Time:

## 2019-03-03 NOTE — DISCHARGE NOTE OB - PATIENT PORTAL LINK FT
You can access the [facility] Patient Portal, offered by [facility], by registering with the following website: [email or web address]

## 2019-03-03 NOTE — DISCHARGE NOTE OB - CARE PLAN
Principal Discharge DX:	 delivery delivered  Goal:	safe discharge home postpartum  Assessment and plan of treatment:	28y female s/p  section, post operative day 3, meeting all postpartum milestones. No heavy lifting. Pelvic rest until cleared. Follow-up with obstetrician in 1-2 weeks.

## 2019-03-03 NOTE — PROGRESS NOTE ADULT - SUBJECTIVE AND OBJECTIVE BOX
Patient evaluated at bedside.   She reports pain is well controlled with OPM.  She has been ambulating without assistance, voiding spontaneously, passing gas, tolerating regular diet and is breastfeeding.    She denies HA, dizziness, CP, palpitations, SOB, n/v, or heavy vaginal bleeding.    Physical Exam:  Vital Signs Last 24 Hrs  T(C): 36.7 (03 Mar 2019 02:10), Max: 36.8 (02 Mar 2019 10:00)  T(F): 98.1 (03 Mar 2019 02:10), Max: 98.2 (02 Mar 2019 10:00)  HR: 80 (03 Mar 2019 02:10) (78 - 80)  BP: 100/63 (03 Mar 2019 02:10) (100/62 - 102/62)  RR: 18 (03 Mar 2019 02:10) (18 - 18)  SpO2: 99% (03 Mar 2019 02:10) (98% - 99%)    Gen: NAD  Abd: + BS, soft, nontender, nondistended, no rebound or guarding  Incision clean, dry and intact  uterus firm at midline  : lochia WNL  Extremities: no swelling or calf tenderness        MEDICATIONS  (STANDING):  diphtheria/tetanus/pertussis (acellular) Vaccine (ADAcel) 0.5 milliLiter(s) IntraMuscular once  ferrous    sulfate 325 milliGRAM(s) Oral daily  heparin  Injectable 5000 Unit(s) SubCutaneous every 12 hours  ibuprofen  Tablet. 600 milliGRAM(s) Oral every 6 hours  ketorolac   Injectable 15 milliGRAM(s) IV Push once  lactated ringers. 1000 milliLiter(s) (125 mL/Hr) IV Continuous <Continuous>  lactated ringers. 1000 milliLiter(s) (125 mL/Hr) IV Continuous <Continuous>  oxytocin Infusion 41.667 milliUNIT(s)/Min (125 mL/Hr) IV Continuous <Continuous>  oxytocin Infusion 333.333 milliUNIT(s)/Min (1000 mL/Hr) IV Continuous <Continuous>  oxytocin Infusion 41.667 milliUNIT(s)/Min (125 mL/Hr) IV Continuous <Continuous>  prenatal multivitamin 1 Tablet(s) Oral daily    MEDICATIONS  (PRN):  acetaminophen   Tablet .. 650 milliGRAM(s) Oral every 6 hours PRN Temp greater or equal to 38.5C (101.3F), Mild Pain (1 - 3)  ALBUTerol    90 MICROgram(s) HFA Inhaler 2 Puff(s) Inhalation every 6 hours PRN Shortness of Breath and/or Wheezing  diphenhydrAMINE 25 milliGRAM(s) Oral every 6 hours PRN Itching  docusate sodium 100 milliGRAM(s) Oral two times a day PRN Stool Softening  glycerin Suppository - Adult 1 Suppository(s) Rectal at bedtime PRN Constipation  HYDROmorphone  Injectable 1 milliGRAM(s) IV Push once PRN Severe Pain (7 - 10)  lanolin Ointment 1 Application(s) Topical every 3 hours PRN Sore Nipples  naloxone Injectable 0.1 milliGRAM(s) IV Push every 3 minutes PRN For ANY of the following changes in patient status:  A. RR LESS THAN 10 breaths per minute, B. Oxygen saturation LESS THAN 90%, C. Sedation score of 6  naloxone Injectable 0.1 milliGRAM(s) IV Push every 3 minutes PRN For ANY of the following changes in patient status:  A. RR LESS THAN 10 breaths per minute, B. Oxygen saturation LESS THAN 90%, C. Sedation score of 6  ondansetron Injectable 4 milliGRAM(s) IV Push every 6 hours PRN Nausea  oxyCODONE    5 mG/acetaminophen 325 mG 1 Tablet(s) Oral every 3 hours PRN Moderate Pain (4 - 6)  oxyCODONE    5 mG/acetaminophen 325 mG 2 Tablet(s) Oral every 6 hours PRN Severe Pain (7 - 10)  simethicone 80 milliGRAM(s) Chew every 4 hours PRN Gas

## 2019-03-03 NOTE — DISCHARGE NOTE OB - MATERIALS PROVIDED
Vaccinations/[facility]  Screening Program/  Immunization Record/Bottle Feeding Log/Guide to Postpartum Care/[facility] Hearing Screen Program/Back To Sleep Handout/Shaken Baby Prevention Handout/Discharge Medication Information for Patients and Families Pocket Guide/MMR Vaccination (VIS Pub Date: 2012)/Tdap Vaccination (VIS Pub Date: 2012)

## 2019-03-03 NOTE — DISCHARGE NOTE OB - PLAN OF CARE
safe discharge home postpartum 28y female s/p  section, post operative day 3, meeting all postpartum milestones. No heavy lifting. Pelvic rest until cleared. Follow-up with obstetrician in 1-2 weeks.

## 2019-03-05 ENCOUNTER — APPOINTMENT (OUTPATIENT)
Dept: OBGYN | Facility: CLINIC | Age: 29
End: 2019-03-05

## 2019-03-06 DIAGNOSIS — Z23 ENCOUNTER FOR IMMUNIZATION: ICD-10-CM

## 2019-03-06 DIAGNOSIS — Z34.03 ENCOUNTER FOR SUPERVISION OF NORMAL FIRST PREGNANCY, THIRD TRIMESTER: ICD-10-CM

## 2019-03-06 DIAGNOSIS — Z3A.38 38 WEEKS GESTATION OF PREGNANCY: ICD-10-CM

## 2019-03-06 LAB — SURGICAL PATHOLOGY STUDY: SIGNIFICANT CHANGE UP

## 2019-03-12 ENCOUNTER — APPOINTMENT (OUTPATIENT)
Dept: OBGYN | Facility: CLINIC | Age: 29
End: 2019-03-12
Payer: COMMERCIAL

## 2019-03-12 VITALS
DIASTOLIC BLOOD PRESSURE: 70 MMHG | SYSTOLIC BLOOD PRESSURE: 110 MMHG | HEIGHT: 59 IN | BODY MASS INDEX: 29.84 KG/M2 | WEIGHT: 148 LBS

## 2019-03-12 DIAGNOSIS — Z32.01 ENCOUNTER FOR PREGNANCY TEST, RESULT POSITIVE: ICD-10-CM

## 2019-03-12 DIAGNOSIS — Z31.69 ENCOUNTER FOR OTHER GENERAL COUNSELING AND ADVICE ON PROCREATION: ICD-10-CM

## 2019-03-12 DIAGNOSIS — Z34.01 ENCOUNTER FOR SUPERVISION OF NORMAL FIRST PREGNANCY, FIRST TRIMESTER: ICD-10-CM

## 2019-03-12 DIAGNOSIS — Z36.0 ENCOUNTER FOR ANTENATAL SCREENING FOR CHROMOSOMAL ANOMALIES: ICD-10-CM

## 2019-03-12 PROCEDURE — 0503F POSTPARTUM CARE VISIT: CPT

## 2019-03-12 NOTE — HISTORY OF PRESENT ILLNESS
[Postpartum Follow Up] : postpartum follow up [Delivery Date: ___] : on [unfilled] [Female] : Delivery History: baby girl [Breastfeeding] : not currently nursing [FreeTextEntry8] : incision check

## 2019-04-02 ENCOUNTER — LABORATORY RESULT (OUTPATIENT)
Age: 29
End: 2019-04-02

## 2019-04-02 ENCOUNTER — APPOINTMENT (OUTPATIENT)
Dept: OBGYN | Facility: CLINIC | Age: 29
End: 2019-04-02
Payer: COMMERCIAL

## 2019-04-02 VITALS
HEIGHT: 59 IN | WEIGHT: 152 LBS | BODY MASS INDEX: 30.64 KG/M2 | SYSTOLIC BLOOD PRESSURE: 100 MMHG | DIASTOLIC BLOOD PRESSURE: 70 MMHG

## 2019-04-02 PROCEDURE — 0503F POSTPARTUM CARE VISIT: CPT

## 2019-04-02 RX ORDER — NORETHINDRONE ACETATE AND ETHINYL ESTRADIOL, ETHINYL ESTRADIOL AND FERROUS FUMARATE 1MG-10(24)
1 MG-10 MCG / KIT ORAL DAILY
Qty: 84 | Refills: 3 | Status: ACTIVE | COMMUNITY
Start: 2019-04-02 | End: 1900-01-01

## 2019-04-02 NOTE — HISTORY OF PRESENT ILLNESS
[Delivery Date: ___] : on [unfilled] [Female] : Delivery History: baby girl [FreeTextEntry8] : six week post partum exam. [Postpartum Follow Up] : postpartum follow up [Complications:___] : no complications [Primary C/S] : delivered by  section [Breastfeeding] : not currently nursing [S/Sx PP Depression] : no signs/symptoms of postpartum depression [Erythema] : not erythematous [Healed] : healed [Back to Normal] : is back to normal in size [Normal] : the vagina was normal [Cervix Sample Taken] : cervical sample not taken for a Pap smear [Not Done] : Examination of breasts not done [Doing Well] : is doing well [No Sign of Infection] : is showing no signs of infection [Excellent Pain Control] : has excellent pain control [None] : None

## 2019-04-03 RX ORDER — NORETHINDRONE ACETATE AND ETHINYL ESTRADIOL AND FERROUS FUMARATE 1MG-20(21)
1-20 KIT ORAL
Qty: 90 | Refills: 3 | Status: ACTIVE | COMMUNITY
Start: 2019-04-03 | End: 1900-01-01

## 2019-04-09 LAB — CYTOLOGY CVX/VAG DOC THIN PREP: NORMAL

## 2019-11-04 ENCOUNTER — APPOINTMENT (OUTPATIENT)
Dept: ORTHOPEDIC SURGERY | Facility: CLINIC | Age: 29
End: 2019-11-04
Payer: COMMERCIAL

## 2019-11-04 VITALS — WEIGHT: 140 LBS | HEIGHT: 59 IN | RESPIRATION RATE: 16 BRPM | BODY MASS INDEX: 28.22 KG/M2

## 2019-11-04 DIAGNOSIS — R22.32 LOCALIZED SWELLING, MASS AND LUMP, LEFT UPPER LIMB: ICD-10-CM

## 2019-11-04 PROBLEM — Z00.00 ENCOUNTER FOR PREVENTIVE HEALTH EXAMINATION: Noted: 2019-11-04

## 2019-11-04 PROCEDURE — 99203 OFFICE O/P NEW LOW 30 MIN: CPT | Mod: 25

## 2019-11-04 PROCEDURE — 20612 ASPIRATE/INJ GANGLION CYST: CPT | Mod: LT

## 2019-11-04 PROCEDURE — 76882 US LMTD JT/FCL EVL NVASC XTR: CPT | Mod: LT

## 2019-11-04 PROCEDURE — 73110 X-RAY EXAM OF WRIST: CPT | Mod: RT

## 2019-11-04 RX ORDER — NORETHINDRONE ACETATE AND ETHINYL ESTRADIOL, ETHINYL ESTRADIOL AND FERROUS FUMARATE 1MG-10(24)
1 MG-10 MCG / KIT ORAL
Refills: 0 | Status: ACTIVE | COMMUNITY

## 2020-02-26 ENCOUNTER — APPOINTMENT (OUTPATIENT)
Dept: OBGYN | Facility: CLINIC | Age: 30
End: 2020-02-26
Payer: COMMERCIAL

## 2020-02-26 ENCOUNTER — APPOINTMENT (OUTPATIENT)
Dept: OBGYN | Facility: CLINIC | Age: 30
End: 2020-02-26

## 2020-02-26 VITALS
SYSTOLIC BLOOD PRESSURE: 100 MMHG | DIASTOLIC BLOOD PRESSURE: 60 MMHG | WEIGHT: 158 LBS | HEIGHT: 59 IN | BODY MASS INDEX: 31.85 KG/M2

## 2020-02-26 DIAGNOSIS — N64.4 MASTODYNIA: ICD-10-CM

## 2020-02-26 DIAGNOSIS — R92.2 INCONCLUSIVE MAMMOGRAM: ICD-10-CM

## 2020-02-26 PROCEDURE — 99213 OFFICE O/P EST LOW 20 MIN: CPT

## 2020-02-26 RX ORDER — CALCIUM CITRATE, IRON PENTACARBONYL, CHOLECALCIFEROL, .ALPHA.-TOCOPHEROL ACETATE, DL-, PYRIDOXINE HYDROCHLORIDE, FOLIC ACID, DOCUSATE SODIUM AND DOCONEXENT 100; 28; 400; 30; 25; 1; 50; 250 MG/1; MG/1; [IU]/1; [IU]/1; MG/1; MG/1; MG/1; MG/1
28-1-250 CAPSULE, LIQUID FILLED ORAL
Qty: 90 | Refills: 3 | Status: ACTIVE | COMMUNITY
Start: 2018-07-11 | End: 1900-01-01

## 2020-02-26 NOTE — PHYSICAL EXAM
[Examination Of The Breasts] : a normal appearance [Breast Palpation Diffuse Fibrous Tissue Bilateral] : fibrocystic changes [Breast Atrophy Bilateral] : no atrophy [Breast Hypertrophy Bilateral] : no hypertrophy [Breast - Peau D'Orange Bilateral] : no Peau D'Orange [Dimpling In Both Breasts] : no dimpling [Superficial Breast Veins Dilated Bilaterally] : no dilated superficial veins [Breast Implant Bilateral] : no implants [No Discharge] : no discharge [Normal] : normal [Enlargement Of The Right Breast] : no swelling [Tenderness Of The Right Breast] : tenderness [Enlargement Of The Left Breast] : no swelling [Tenderness Of The Left Breast] : no tenderness [___] : had no induration [No Masses] : no breast masses were palpable [Axillary Lymph Nodes Enlarged Bilaterally] : no enlarged nodes

## 2020-03-05 NOTE — CHIEF COMPLAINT
[Follow Up] : follow up GYN visit [FreeTextEntry1] : PATIENT PRESENT FOR GYN FOLOOW UP. PATIENT STATES SHE HAS A LUMP ON HER BREAST.

## 2021-04-28 ENCOUNTER — EMERGENCY (EMERGENCY)
Facility: HOSPITAL | Age: 31
LOS: 1 days | Discharge: HOME | End: 2021-04-28
Attending: EMERGENCY MEDICINE
Payer: COMMERCIAL

## 2021-04-28 VITALS
RESPIRATION RATE: 17 BRPM | OXYGEN SATURATION: 99 % | DIASTOLIC BLOOD PRESSURE: 65 MMHG | HEART RATE: 99 BPM | WEIGHT: 169.98 LBS | HEIGHT: 59 IN | SYSTOLIC BLOOD PRESSURE: 109 MMHG | TEMPERATURE: 98 F

## 2021-04-28 VITALS — HEART RATE: 98 BPM | RESPIRATION RATE: 17 BRPM | OXYGEN SATURATION: 99 %

## 2021-04-28 DIAGNOSIS — Z98.891 HISTORY OF UTERINE SCAR FROM PREVIOUS SURGERY: Chronic | ICD-10-CM

## 2021-04-28 LAB
ALBUMIN SERPL ELPH-MCNC: 3.6 G/DL — SIGNIFICANT CHANGE UP (ref 3.3–5)
ALP SERPL-CCNC: 79 U/L — SIGNIFICANT CHANGE UP (ref 40–120)
ALT FLD-CCNC: 12 U/L — SIGNIFICANT CHANGE UP (ref 10–45)
ANION GAP SERPL CALC-SCNC: 14 MMOL/L — SIGNIFICANT CHANGE UP (ref 5–17)
APTT BLD: 26 SEC — LOW (ref 27.5–35.5)
AST SERPL-CCNC: 17 U/L — SIGNIFICANT CHANGE UP (ref 10–40)
B PERT IGG+IGM PNL SER: ABNORMAL
BASOPHILS # BLD AUTO: 0.02 K/UL — SIGNIFICANT CHANGE UP (ref 0–0.2)
BASOPHILS NFR BLD AUTO: 0.2 % — SIGNIFICANT CHANGE UP (ref 0–2)
BILIRUB SERPL-MCNC: 0.2 MG/DL — SIGNIFICANT CHANGE UP (ref 0.2–1.2)
BUN SERPL-MCNC: 6 MG/DL — LOW (ref 7–23)
CALCIUM SERPL-MCNC: 8.8 MG/DL — SIGNIFICANT CHANGE UP (ref 8.4–10.5)
CHLORIDE SERPL-SCNC: 103 MMOL/L — SIGNIFICANT CHANGE UP (ref 96–108)
CO2 SERPL-SCNC: 18 MMOL/L — LOW (ref 22–31)
COLOR FLD: SIGNIFICANT CHANGE UP
CREAT SERPL-MCNC: 0.43 MG/DL — LOW (ref 0.5–1.3)
EOSINOPHIL # BLD AUTO: 0.07 K/UL — SIGNIFICANT CHANGE UP (ref 0–0.5)
EOSINOPHIL NFR BLD AUTO: 0.6 % — SIGNIFICANT CHANGE UP (ref 0–6)
ERYTHROCYTE [SEDIMENTATION RATE] IN BLOOD: 50 MM/HR — HIGH (ref 0–15)
FLUID INTAKE SUBSTANCE CLASS: SIGNIFICANT CHANGE UP
FLUID SEGMENTED GRANULOCYTES: 90 % — SIGNIFICANT CHANGE UP
GLUCOSE FLD-MCNC: 72 MG/DL — SIGNIFICANT CHANGE UP
GLUCOSE SERPL-MCNC: 72 MG/DL — SIGNIFICANT CHANGE UP (ref 70–99)
HCT VFR BLD CALC: 33.5 % — LOW (ref 34.5–45)
HGB BLD-MCNC: 11 G/DL — LOW (ref 11.5–15.5)
IMM GRANULOCYTES NFR BLD AUTO: 1 % — SIGNIFICANT CHANGE UP (ref 0–1.5)
INR BLD: 1 RATIO — SIGNIFICANT CHANGE UP (ref 0.88–1.16)
LYMPHOCYTES # BLD AUTO: 1.81 K/UL — SIGNIFICANT CHANGE UP (ref 1–3.3)
LYMPHOCYTES # BLD AUTO: 16.5 % — SIGNIFICANT CHANGE UP (ref 13–44)
LYMPHOCYTES # FLD: 2 % — SIGNIFICANT CHANGE UP
MCHC RBC-ENTMCNC: 28.9 PG — SIGNIFICANT CHANGE UP (ref 27–34)
MCHC RBC-ENTMCNC: 32.8 GM/DL — SIGNIFICANT CHANGE UP (ref 32–36)
MCV RBC AUTO: 88.2 FL — SIGNIFICANT CHANGE UP (ref 80–100)
MONOCYTES # BLD AUTO: 0.63 K/UL — SIGNIFICANT CHANGE UP (ref 0–0.9)
MONOCYTES NFR BLD AUTO: 5.8 % — SIGNIFICANT CHANGE UP (ref 2–14)
MONOS+MACROS # FLD: 8 % — SIGNIFICANT CHANGE UP
NEUTROPHILS # BLD AUTO: 8.31 K/UL — HIGH (ref 1.8–7.4)
NEUTROPHILS NFR BLD AUTO: 75.9 % — SIGNIFICANT CHANGE UP (ref 43–77)
NRBC # BLD: 0 /100 WBCS — SIGNIFICANT CHANGE UP (ref 0–0)
PLATELET # BLD AUTO: 323 K/UL — SIGNIFICANT CHANGE UP (ref 150–400)
POTASSIUM SERPL-MCNC: 4.2 MMOL/L — SIGNIFICANT CHANGE UP (ref 3.5–5.3)
POTASSIUM SERPL-SCNC: 4.2 MMOL/L — SIGNIFICANT CHANGE UP (ref 3.5–5.3)
PROT SERPL-MCNC: 6.6 G/DL — SIGNIFICANT CHANGE UP (ref 6–8.3)
PROTHROM AB SERPL-ACNC: 12 SEC — SIGNIFICANT CHANGE UP (ref 10.6–13.6)
RBC # BLD: 3.8 M/UL — SIGNIFICANT CHANGE UP (ref 3.8–5.2)
RBC # FLD: 12.8 % — SIGNIFICANT CHANGE UP (ref 10.3–14.5)
RCV VOL RI: 900 /UL — HIGH (ref 0–0)
SODIUM SERPL-SCNC: 135 MMOL/L — SIGNIFICANT CHANGE UP (ref 135–145)
SYNOVIAL CRYSTALS CLARITY: SIGNIFICANT CHANGE UP
SYNOVIAL CRYSTALS COLOR: YELLOW
SYNOVIAL CRYSTALS ID: ABNORMAL
SYNOVIAL CRYSTALS TUBE: SIGNIFICANT CHANGE UP
TOTAL NUCLEATED CELL COUNT, BODY FLUID: 1980 /UL — SIGNIFICANT CHANGE UP
TUBE TYPE: SIGNIFICANT CHANGE UP
WBC # BLD: 10.95 K/UL — HIGH (ref 3.8–10.5)
WBC # FLD AUTO: 10.95 K/UL — HIGH (ref 3.8–10.5)

## 2021-04-28 PROCEDURE — 84157 ASSAY OF PROTEIN OTHER: CPT

## 2021-04-28 PROCEDURE — 20610 DRAIN/INJ JOINT/BURSA W/O US: CPT | Mod: LT

## 2021-04-28 PROCEDURE — 99284 EMERGENCY DEPT VISIT MOD MDM: CPT | Mod: 25

## 2021-04-28 PROCEDURE — 82945 GLUCOSE OTHER FLUID: CPT

## 2021-04-28 PROCEDURE — 80053 COMPREHEN METABOLIC PANEL: CPT

## 2021-04-28 PROCEDURE — 85610 PROTHROMBIN TIME: CPT

## 2021-04-28 PROCEDURE — 87075 CULTR BACTERIA EXCEPT BLOOD: CPT

## 2021-04-28 PROCEDURE — 89051 BODY FLUID CELL COUNT: CPT

## 2021-04-28 PROCEDURE — 87205 SMEAR GRAM STAIN: CPT

## 2021-04-28 PROCEDURE — 93971 EXTREMITY STUDY: CPT

## 2021-04-28 PROCEDURE — 93971 EXTREMITY STUDY: CPT | Mod: 26,LT

## 2021-04-28 PROCEDURE — 73562 X-RAY EXAM OF KNEE 3: CPT

## 2021-04-28 PROCEDURE — 85025 COMPLETE CBC W/AUTO DIFF WBC: CPT

## 2021-04-28 PROCEDURE — 99285 EMERGENCY DEPT VISIT HI MDM: CPT | Mod: 25

## 2021-04-28 PROCEDURE — 85730 THROMBOPLASTIN TIME PARTIAL: CPT

## 2021-04-28 PROCEDURE — 87070 CULTURE OTHR SPECIMN AEROBIC: CPT

## 2021-04-28 PROCEDURE — 89060 EXAM SYNOVIAL FLUID CRYSTALS: CPT

## 2021-04-28 PROCEDURE — 85652 RBC SED RATE AUTOMATED: CPT

## 2021-04-28 PROCEDURE — 73562 X-RAY EXAM OF KNEE 3: CPT | Mod: 26,LT

## 2021-04-28 PROCEDURE — 86140 C-REACTIVE PROTEIN: CPT

## 2021-04-28 RX ORDER — ACETAMINOPHEN 500 MG
975 TABLET ORAL ONCE
Refills: 0 | Status: COMPLETED | OUTPATIENT
Start: 2021-04-28 | End: 2021-04-28

## 2021-04-28 RX ADMIN — Medication 975 MILLIGRAM(S): at 18:11

## 2021-04-28 NOTE — ED PROVIDER NOTE - NSFOLLOWUPCLINICS_GEN_ALL_ED_FT
[facility]thopedics  .  [place]   Phone: [phone]  Fax:   Follow Up Time: 4-6 Days

## 2021-04-28 NOTE — ED ADULT NURSE NOTE - OBJECTIVE STATEMENT
32 y/o female coming to the ER with c/o right leg pain. A&Ox4. Ambulatory. No significant PMH. Patient is 28.5 weeks pregnant with her seconds child, yesterday she began to have right leg pain and her OB instructed her to come to the ER to r/o a DVT. Patient has no hx of clots. Right leg is swollen above the knee with redness noted to the medial aspect of the knee. Patient reports some "pins and needles" in the right foot. Pedal pulses strong and equal b/l. Patient is able to move her leg and foot with discomfort. Patient reports pain is currently 8/10, she reports taking tylenol around 0900 today with no relief. Patient denies chest pain, SOB, fever, chills, n/v/d, urinary symptoms, abdominal pain, vaginal discharge or bleeding, fall or trauma to the knee. Safety measures maintained. Bed in the lowest position. Call bell within reach.  MD at the bedside. No acute distress noted or further complaints at this time. 30 y/o female coming to the ER with c/o right leg pain. A&Ox4. Ambulatory. No significant PMH. Patient is 28.5 weeks pregnant with her second child, yesterday she began to have left leg pain and her OB instructed her to come to the ER to r/o a DVT. Patient has no hx of clots. Left leg is swollen above the knee with redness noted to the medial aspect of the knee. Patient reports some "pins and needles" in the left foot. Pedal pulses strong and equal b/l. Patient is able to move her leg and foot with discomfort, difficulty bending the left knee. Patient reports pain is currently 8/10, she reports taking tylenol around 0900 today with no relief. Patient denies chest pain, SOB, fever, chills, n/v/d, urinary symptoms, abdominal pain, vaginal discharge or bleeding, fall or trauma to the knee. Safety measures maintained. Bed in the lowest position. Call bell within reach.  MD at the bedside. No acute distress noted or further complaints at this time.

## 2021-04-28 NOTE — CONSULT NOTE ADULT - SUBJECTIVE AND OBJECTIVE BOX
R2 GYNECOLOGY CONSULT NOTE    32yo  @ 29w0d (MARYANN [birth year]) presenting for r/o LLE DVT. Pt reports sudden onset swelling and pain surrounding left knee cap, extending into distal thigh which started yesterday. Pt reports inability to bend knee 2/2 discomfort. Pt presenting today 2/2 worsened swelling and discomfort. She was recommended to present for r/o DVT. Discomfort associated with numbness into LLE and now radiating up to back. Does not have h/o sciatica or similar symptoms in previous pregnancy. Denies trauma to leg. Denies CP, SOB, fevers/chills. Denies CTX, LOF, VB. +FM.    OB/GYN HISTORY:   G1 2019 pLTCS 2/2 NRFHT  G2 current, placenta previa, no genetic/sono abnl w/fetus  denies fibroids, ov cysts, STIs, abnl Pap smears  OBGYN = [facility]    PMSH: migraines, breast reduction,  section, sinus surgery    Meds: PNV  All: NKDA    Soc: denies T/E/D  Psych: ADD    REVIEW OF SYSTEMS: All other review of systems is negative unless indicated above.    Vital Signs Last 24 Hrs  T(C): 36.7 (2021 12:14), Max: 36.7 (2021 12:14)  T(F): 98.1 (2021 12:14), Max: 98.1 (2021 12:14)  HR: 95 (2021 13:41) (95 - 99)  BP: 116/50 (2021 13:41) (109/65 - 128/82)  RR: 20 (2021 13:41) (16 - 20)  SpO2: 99% (2021 13:41) (99% - 100%)    PHYSICAL EXAM:  Gen: awake, alert, NAD  Chest: nonlabored breathing  Abd: soft, nontender, gravid  : deferred  Ext: no calf tenderness or erythema; left upper leg tender to light palpation, limited ROM 2/2 pain    LABS:                        11.0   10.95 )-----------( 323      ( 2021 13:20 )             33.5     04-    135  |  103  |  6<L>  ----------------------------<  72  4.2   |  18<L>  |  0.43<L>    Ca    8.8      2021 13:21    TPro  6.6  /  Alb  3.6  /  TBili  0.2  /  DBili  x   /  AST  17  /  ALT  12  /  AlkPhos  79  -28    PT/INR - ( 2021 13:20 )   PT: 12.0 sec;   INR: 1.00 ratio      PTT - ( 2021 13:20 )  PTT:26.0 sec    RADIOLOGY & ADDITIONAL STUDIES:

## 2021-04-28 NOTE — ED PROVIDER NOTE - PATIENT PORTAL LINK FT
You can access the FollowMyHealth Patient Portal offered by [facility] by registering at the following website: [email or web address]. By joining [employer]’s FollowMyHealth portal, you will also be able to view your health information using other applications (apps) compatible with our system.

## 2021-04-28 NOTE — CONSULT NOTE ADULT - ATTENDING COMMENTS
Pt seen and examined by myself.   P1@ 29wks with sudden swelling of left knee cap area. tender and difficult to bend left knee.   no OB complaints.  +gfm, no vb/lof.   VSS afebrile  blood work neg.   left leg DVT neg, left knee xray neg.   FHR being monitored at this time.   ER planning on tapping pt's left knee effusion.   OB signing off.   pt to fu with us 5/13 in office and will have staff call her tomorrow for fu.   ector sanders md

## 2021-04-28 NOTE — ED PROVIDER NOTE - NSFOLLOWUPINSTRUCTIONS_ED_ALL_ED_FT
You were seen in the ER for left leg pain.    Please take tylenol as directed and as needed.     Please follow with your OBGYN as scheduled.     Make an appt with the spine center within a few days. Call [phone] for an appt.     Please return to the ER for any worsening or new symptoms. You were seen in the ER for left leg pain.    Please take tylenol as directed and as needed.     Please follow with your OBGYN as scheduled.     Please follow up with ortho clinic in 4-6 days for left knee pain.     Make an appt with the spine center within a few days for parethesias of left leg. Call [phone] for an appt.     Please return to the ER for any worsening or new symptoms.

## 2021-04-28 NOTE — ED PROVIDER NOTE - CLINICAL SUMMARY MEDICAL DECISION MAKING FREE TEXT BOX
DDX: DVT, pseudogout/gout, sciatica  Plan: labs, US left LE, xray left knee DDX: DVT, pseudogout/gout, sciatica  Plan: labs, US left LE, xray left knee    Attending Statement: Agree with the above.  LLE pain and paresthesias x 2 days.  Noted to have L knee effusion without erythema.  No motor impairment, no bowel/bladder dysfunction, no saddle anesthesia.  Bedside MSK US shows L knee effusion.  No f/c.  Pregnancy course c/b placenta previa thus far but denies abd/pelvic pain, back pain, vb.  Patient does have thigh pain/swelling that is c/f deep venous thrombosis though may be related to effusion alone.  Paresthesias likely not related, probably more related to sciatica in context of decreased ambulation c LLE pain.  LLE vascular exam is entirely WNL and .  Plan as above.  Consider spine c/s given pt's stated progressively worsening paresthesias.  Reassess after imaging.  Likely will need arthrocentesis and synovial analysis.  --BMM DDX: DVT, pseudogout/gout, sciatica  Plan: labs, US left LE, xray left knee    Attending Statement: Agree with the above.  LLE pain and paresthesias x 2 days.  Noted to have L knee effusion without erythema.  No motor impairment, no bowel/bladder dysfunction, no saddle anesthesia.  Bedside MSK US shows L knee effusion.  No f/c.  Pregnancy course c/b placenta previa thus far but denies abd/pelvic pain, back pain, vb.  Patient does have thigh pain/swelling that is c/f deep venous thrombosis though may be related to effusion alone.  Paresthesias likely not related, probably more related to sciatica in context of decreased ambulation c LLE pain.  LLE vascular exam is entirely WNL and .  Plan as above.  Consider spine c/s given pt's stated progressively worsening paresthesias.  Reassess after imaging.  Likely will need arthrocentesis and synovial analysis.  --BMM    US left LE shows no evidence of DVT. Left knee xray unremarkable.   Performed arthrocentesis on left knee, synovial analysis c/w noninflammatory effusion.   Patient paresthesias resolved after walking around. Pt opting for outpatient spine specialist mgmt.   Patient will follow up with her OBGYN as scheduled on 05/13 and spine specialist in 1-3 days. Given return precautions. DDX: DVT, pseudogout/gout, sciatica  Plan: labs, US left LE, xray left knee    Attending Statement: Agree with the above.  LLE pain and paresthesias x 2 days.  Noted to have L knee effusion without erythema.  No motor impairment, no bowel/bladder dysfunction, no saddle anesthesia.  Bedside MSK US shows L knee effusion.  No f/c.  Pregnancy course c/b placenta previa thus far but denies abd/pelvic pain, back pain, vb.  Patient does have thigh pain/swelling that is c/f deep venous thrombosis though may be related to effusion alone.  Paresthesias likely not related, probably more related to sciatica in context of decreased ambulation c LLE pain.  LLE vascular exam is entirely WNL and .  Plan as above.  Consider spine c/s given pt's stated progressively worsening paresthesias.  Reassess after imaging.  Likely will need arthrocentesis and synovial analysis.  --BMM    Discussed pt case with Dr. Theresa CAGLE, who will have office call her tomorrow or Friday and will follow up in the office outpatient.   US left LE shows no evidence of DVT. Left knee xray unremarkable.   Performed arthrocentesis on left knee, synovial analysis c/w noninflammatory effusion.   Patient paresthesias resolved after walking around. Pt opting for outpatient spine specialist mgmt.   Patient will follow up with her OBGYN as scheduled on 05/13 and spine specialist in 1-3 days. Given return precautions.

## 2021-04-28 NOTE — ED ADULT NURSE REASSESSMENT NOTE - NS ED NURSE REASSESS COMMENT FT1
Patient ambulated to the restroom. Patient stated "it feels good to walk, my knee feels better than it has in a few days". Patient walking with a steady gait.

## 2021-04-28 NOTE — CONSULT NOTE ADULT - ASSESSMENT
32yo  @ 29w0d (MARYANN 21) p/w LLE pain and tenderness in upper leg, r/o DVT. Vital signs stable, no OB complaints.   - Workup per ED  - Rec supportive care for knee pain  - No acute OBGYN intervention  - Bedside , will complete NST for FWB    d/w Dr. Theresa Tsai R2

## 2021-04-28 NOTE — ED PROVIDER NOTE - CARE PROVIDER_API CALL
Ami Chapman)  Obstetrics and Gynecology  [address] [facility], [address] [place] [address]  Phone: [phone]  Fax: [phone]  Established Patient  Scheduled Appointment: 05/13/2021

## 2021-04-28 NOTE — ED PROVIDER NOTE - MUSCULOSKELETAL, MLM
left upper leg tenderness left upper leg tenderness. Left knee effusion with tenderness. Limited ROM of left knee secondary to pain. 2+ DP/PT pulses. Subjective left leg paresthesias.

## 2021-04-28 NOTE — ED PROVIDER NOTE - PROGRESS NOTE DETAILS
Patient states her left leg paresthesias resolved after walking around. Shared decision making with patient, discussing the possibility of observation and MRI lumbar vs outpatient management with spine specialist. Patient states she would like to opt for outpatient mgmt with spine specialist.

## 2021-04-29 LAB
GRAM STN FLD: SIGNIFICANT CHANGE UP
SPECIMEN SOURCE: SIGNIFICANT CHANGE UP

## 2021-04-30 ENCOUNTER — TRANSCRIPTION ENCOUNTER (OUTPATIENT)
Age: 31
End: 2021-04-30

## 2021-05-03 ENCOUNTER — APPOINTMENT (OUTPATIENT)
Dept: ORTHOPEDIC SURGERY | Facility: CLINIC | Age: 31
End: 2021-05-03
Payer: COMMERCIAL

## 2021-05-03 VITALS
DIASTOLIC BLOOD PRESSURE: 60 MMHG | HEART RATE: 93 BPM | SYSTOLIC BLOOD PRESSURE: 98 MMHG | HEIGHT: 59 IN | BODY MASS INDEX: 35.28 KG/M2 | WEIGHT: 175 LBS

## 2021-05-03 DIAGNOSIS — M25.562 PAIN IN LEFT KNEE: ICD-10-CM

## 2021-05-03 PROCEDURE — 99204 OFFICE O/P NEW MOD 45 MIN: CPT

## 2021-05-03 PROCEDURE — 99072 ADDL SUPL MATRL&STAF TM PHE: CPT

## 2021-05-03 NOTE — PHYSICAL EXAM
[de-identified] : Constitutional:Well nourished , well developed and in no acute distress\par Psychiatric: Alert and oriented to time place and person.Appropriate affect\par Respiratory: Unlabored respirations,no audible wheezing\par Cardiovascular: no leg swelling  ankle edema\par Vascular: no calf or thigh tenderness, \par Peripheral pulses; intact\par Skin:Head, neck, arms and lower extremities:no lesions or discoloration\par Lymphatics:No groin adenopathy\par Neurological: intact light touch sensation and grossly intact coordination and motor power.\par Knee; left antalgic gait moderate effusion passive range of motion limited secondary to pain flexion is 0/40 diffuse tenderness neurovascular intact [de-identified] : X-rays left knee multiple views of April 28, 2021 within normal limits

## 2021-05-03 NOTE — HISTORY OF PRESENT ILLNESS
[de-identified] : 31-year-old female who is 6 months pregnant. Recent acute onset of painful swelling of left knee. Denies fever chills. He underwent evaluation [facility] emergency room. Aspiration revealed "pseudogout". Patient referred for further followup. Denies locking giving out. Ambulating independently

## 2021-05-03 NOTE — DISCUSSION/SUMMARY
[de-identified] : Impression; Synovitis left knee chondrocalcinosis as per history, possible internal derangement.\par Plan given 6 months pregnancy I have recommended aspiration and steroid injection.. referred to physiatry

## 2021-05-04 ENCOUNTER — APPOINTMENT (OUTPATIENT)
Dept: ORTHOPEDIC SURGERY | Facility: CLINIC | Age: 31
End: 2021-05-04
Payer: COMMERCIAL

## 2021-05-04 ENCOUNTER — NON-APPOINTMENT (OUTPATIENT)
Age: 31
End: 2021-05-04

## 2021-05-04 PROCEDURE — 99214 OFFICE O/P EST MOD 30 MIN: CPT | Mod: 25

## 2021-05-04 PROCEDURE — 20611 DRAIN/INJ JOINT/BURSA W/US: CPT | Mod: LT

## 2021-05-05 LAB
SYCRY CLARITY: ABNORMAL
SYCRY COLOR: YELLOW
SYCRY ID: NORMAL
SYCRY TUBE: NORMAL

## 2021-05-06 ENCOUNTER — INPATIENT (INPATIENT)
Facility: HOSPITAL | Age: 31
LOS: 0 days | Discharge: HOME | DRG: 833 | End: 2021-05-07
Attending: OBSTETRICS & GYNECOLOGY | Admitting: OBSTETRICS & GYNECOLOGY
Payer: COMMERCIAL

## 2021-05-06 VITALS — HEART RATE: 88 BPM | SYSTOLIC BLOOD PRESSURE: 115 MMHG | DIASTOLIC BLOOD PRESSURE: 68 MMHG

## 2021-05-06 DIAGNOSIS — Z3A.00 WEEKS OF GESTATION OF PREGNANCY NOT SPECIFIED: ICD-10-CM

## 2021-05-06 DIAGNOSIS — Z98.891 HISTORY OF UTERINE SCAR FROM PREVIOUS SURGERY: Chronic | ICD-10-CM

## 2021-05-06 DIAGNOSIS — O26.899 OTHER SPECIFIED PREGNANCY RELATED CONDITIONS, UNSPECIFIED TRIMESTER: ICD-10-CM

## 2021-05-06 DIAGNOSIS — Z34.80 ENCOUNTER FOR SUPERVISION OF OTHER NORMAL PREGNANCY, UNSPECIFIED TRIMESTER: ICD-10-CM

## 2021-05-06 LAB
APTT BLD: 26.7 SEC — LOW (ref 27.5–35.5)
BLD GP AB SCN SERPL QL: NEGATIVE — SIGNIFICANT CHANGE UP
COVID-19 SPIKE DOMAIN AB INTERP: POSITIVE
COVID-19 SPIKE DOMAIN ANTIBODY RESULT: >250 U/ML — HIGH
FIBRINOGEN PPP-MCNC: 671 MG/DL — HIGH (ref 290–520)
HCT VFR BLD CALC: 32.1 % — LOW (ref 34.5–45)
HGB BLD-MCNC: 10.3 G/DL — LOW (ref 11.5–15.5)
INR BLD: 0.96 RATIO — SIGNIFICANT CHANGE UP (ref 0.88–1.16)
MCHC RBC-ENTMCNC: 28.5 PG — SIGNIFICANT CHANGE UP (ref 27–34)
MCHC RBC-ENTMCNC: 32.1 GM/DL — SIGNIFICANT CHANGE UP (ref 32–36)
MCV RBC AUTO: 88.9 FL — SIGNIFICANT CHANGE UP (ref 80–100)
NRBC # BLD: 0 /100 WBCS — SIGNIFICANT CHANGE UP (ref 0–0)
PLATELET # BLD AUTO: 343 K/UL — SIGNIFICANT CHANGE UP (ref 150–400)
PROTHROM AB SERPL-ACNC: 11.5 SEC — SIGNIFICANT CHANGE UP (ref 10.6–13.6)
RBC # BLD: 3.61 M/UL — LOW (ref 3.8–5.2)
RBC # FLD: 12.7 % — SIGNIFICANT CHANGE UP (ref 10.3–14.5)
RH IG SCN BLD-IMP: POSITIVE — SIGNIFICANT CHANGE UP
RH IG SCN BLD-IMP: POSITIVE — SIGNIFICANT CHANGE UP
SARS-COV-2 IGG+IGM SERPL QL IA: >250 U/ML — HIGH
SARS-COV-2 IGG+IGM SERPL QL IA: POSITIVE
SARS-COV-2 RNA SPEC QL NAA+PROBE: SIGNIFICANT CHANGE UP
WBC # BLD: 9.02 K/UL — SIGNIFICANT CHANGE UP (ref 3.8–10.5)
WBC # FLD AUTO: 9.02 K/UL — SIGNIFICANT CHANGE UP (ref 3.8–10.5)

## 2021-05-06 RX ORDER — SENNA PLUS 8.6 MG/1
2 TABLET ORAL AT BEDTIME
Refills: 0 | Status: DISCONTINUED | OUTPATIENT
Start: 2021-05-06 | End: 2021-05-07

## 2021-05-06 RX ORDER — FERROUS SULFATE 325(65) MG
325 TABLET ORAL DAILY
Refills: 0 | Status: DISCONTINUED | OUTPATIENT
Start: 2021-05-06 | End: 2021-05-06

## 2021-05-06 RX ORDER — FOLIC ACID 0.8 MG
1 TABLET ORAL DAILY
Refills: 0 | Status: DISCONTINUED | OUTPATIENT
Start: 2021-05-06 | End: 2021-05-06

## 2021-05-06 RX ORDER — DIPHENHYDRAMINE HCL 50 MG
25 CAPSULE ORAL AT BEDTIME
Refills: 0 | Status: DISCONTINUED | OUTPATIENT
Start: 2021-05-06 | End: 2021-05-07

## 2021-05-06 RX ORDER — FERROUS SULFATE 325(65) MG
325 TABLET ORAL DAILY
Refills: 0 | Status: DISCONTINUED | OUTPATIENT
Start: 2021-05-06 | End: 2021-05-07

## 2021-05-06 RX ORDER — FOLIC ACID 0.8 MG
1 TABLET ORAL DAILY
Refills: 0 | Status: DISCONTINUED | OUTPATIENT
Start: 2021-05-06 | End: 2021-05-07

## 2021-05-06 RX ORDER — ACETAMINOPHEN 500 MG
975 TABLET ORAL EVERY 6 HOURS
Refills: 0 | Status: DISCONTINUED | OUTPATIENT
Start: 2021-05-06 | End: 2021-05-07

## 2021-05-06 RX ADMIN — Medication 325 MILLIGRAM(S): at 17:15

## 2021-05-06 RX ADMIN — Medication 25 MILLIGRAM(S): at 23:34

## 2021-05-06 RX ADMIN — Medication 1 MILLIGRAM(S): at 17:14

## 2021-05-06 RX ADMIN — Medication 12 MILLIGRAM(S): at 07:31

## 2021-05-06 RX ADMIN — Medication 975 MILLIGRAM(S): at 18:25

## 2021-05-06 RX ADMIN — Medication 975 MILLIGRAM(S): at 17:29

## 2021-05-06 RX ADMIN — Medication 1 TABLET(S): at 17:15

## 2021-05-06 RX ADMIN — Medication 975 MILLIGRAM(S): at 23:34

## 2021-05-06 NOTE — OB PROVIDER H&P - NSHPLABSRESULTS_GEN_ALL_CORE
Vital Signs Last 24 Hrs  T(C): 36.7 (06 May 2021 06:16), Max: 36.7 (06 May 2021 06:16)  T(F): 98.1 (06 May 2021 06:16), Max: 98.1 (06 May 2021 06:16)  HR: 94 (06 May 2021 09:47) (82 - 103)  BP: 115/68 (06 May 2021 06:16) (115/68 - 115/68)  BP(mean): --  RR: 19 (06 May 2021 06:16) (19 - 19)  SpO2: 98% (06 May 2021 09:47) (94% - 100%)                          10.3   9.02  )-----------( 343      ( 06 May 2021 07:19 )             32.1                   PT/INR - ( 06 May 2021 07:19 )   PT: 11.5 sec;   INR: 0.96 ratio         PTT - ( 06 May 2021 07:19 )  PTT:26.7 sec

## 2021-05-06 NOTE — OB PROVIDER H&P - HISTORY OF PRESENT ILLNESS
31yo  at 30w1d, known previa, presenting with first episode of vaginal bleeding. Pt states she woke up at 4am with episode of vaginal bleeding. States she soaked through her underwear and into her bed sheets, unable to quantify amount. Pt states she put on a pad and came straight here. Reports additional episodes of spotting during urination but reports pad has very minimal bleeding. Denies dizziness, lightheadedness, abdominal pain, nausea/vomiting. -CTX, -LOF, +FM     PNC: placenta previa; no previous episodes of bleeding this pregnancy    OBHx: pLTCS  [facility] 6#6  GynHx: denies fibroids, cysts, abnormal pap smears, STId  PMH: diagnosed with pseudogout in knee this pregnancy  PSH: c/s x1  Social: denies anxiety/depression  Meds: PNV  All: NKDA

## 2021-05-06 NOTE — OB PROVIDER H&P - ATTENDING COMMENTS
Pt at 30 weeks with placenta previa sp 1st bleed.  Not active at tis time no ctx  sp betamethasone x 1  will admit for observation and second steroid shot

## 2021-05-06 NOTE — OB PROVIDER TRIAGE NOTE - NSOBPROVIDERNOTE_OBGYN_ALL_OB_FT
33yo  at 30w1d, with known previa, presenting with first episode of vaginal bleeding at home. FHT category 1 with no current vaginal bleeding.    - CBC, Coags/Fibrinogen, T&S  - BMZ  - will obtain official US from private OB office  - observe VB/FHT/[place] in triage   - reassesses for admission    SAEID Peter, PGY-2  d/w Dr. Cardenas 31yo  at 30w1d, with known previa, presenting with first episode of vaginal bleeding at home. FHT category 1 with no current vaginal bleeding.    - CBC, Coags/Fibrinogen, T&S  - BMZ  - will obtain official US from private OB office  - observe VB/FHT/[place] in triage   - reassesses for admission    SAEID Peter, PGY-2  d/w Dr. Cardenas    I have  examined this pta dn I agree with the clinical plan  for betamethasone and observation  HKP

## 2021-05-06 NOTE — OB RN PATIENT PROFILE - LIMIT VISITORS, INFANT PROFILE
Report received. Pt denies any needs.Discussed POC for the day with Pt. Call light within reach, encouraged pt to call for assistance.  paged to get updates on home infusion.    no

## 2021-05-06 NOTE — OB PROVIDER TRIAGE NOTE - NSHPPHYSICALEXAM_GEN_ALL_CORE
VS  T(C): 36.7 (05-06-21 @ 06:16)  HR: 93 (05-06-21 @ 07:03)  BP: 115/68 (05-06-21 @ 06:16)  RR: 19 (05-06-21 @ 06:16)  SpO2: 97% (05-06-21 @ 07:03)    Gen: NAD  Resp: unlabored on RA  CV: RR  GI: soft, nontender, gravid  Ext: no edema or calf tenderness b/l    FHT: 140, mod, + accels, - decels  [place]: no contractions    SSE: no pooling in vagina; old blood/mucus at cervical os with no active bleeding; cervix appears 0.5cm dilated  Bedside transabdominal sonogram: anterior placenta previa (appears complete), footling breech, fluid grossly normal

## 2021-05-06 NOTE — OB RN PATIENT PROFILE - NS PRO RUBELLA RECEIVED Y/N
Physical Therapy Daily Treatment      Visit Count: 2   Authorization Status: na  Next Referring Provider Visit: as needed    Initial Evaluation Date: 2/21/2017  Referred by: Dr. Derrick Nobles MD  Medical Diagnosis (from order): M54.16 Lumbar radiculopathy (primary encounter diagnosis)   Treatment Diagnosis: Back Symptoms with Pain, Impaired Posture, Impaired Joint Mobility, Impaired Range of Motion, Impaired Motor Function/Muscle Performance, Radiating Pain, Impaired Gait/Locomotion Deficits, Impaired Mobility and Impaired Balance  Primary Insurance: [employer]  Secondary Insurance: N/A  Requirements: VISIT LIMIT: MEDICAL NECESSITY  AUTHORIZATION NEEDED: NO  NOTES: MUST FOLLOW MEDICARE GUIDELINES     Date of Onset: new onset; 2/13/2017   Diagnosis Precautions: none  Relevant co-morbidities and medications: h/o of lung cancer   Relevant Tests: Relevant diagnostic tests: plain film radiograph        SUBJECTIVE   \"felt much better after last treatment, slept really well last night, but then it was pretty painful while making breakfast this morning\"  Current Pain: 3/10.    Functional Change: slightly better standing and walking tolerance    OBJECTIVE       Treatment   Neuromuscular Reeducation:  Sitting posture education  *lumbar flexion in sitting for when radicular symptoms flare     Therapeutic Exercise:   See HEP, all exercises performed and reviewed, cues for pelvic tilt to focus on just a post pelvic tilt and not go into bridging  *hip abd hookying/adductor stretch     Manual Therapy:   STM/MFR to L buttock, SI, lumbar, greater trochanter and IT band  Gentle manual stretching and mobilization to L hip  Long leg traction   trial of stretching L hip into extension in sidelying but too painful so stopped  Therapeutic Activity:  Reviewed use of stool to flex L hip/knee to reduce radicular symptoms in standing   use of seated flex to address radicular symptoms     Current Home Program:  *  above denotes home program issuance as well  LTR  Pelvic tilts  Single knee to chest     ASSESSMENT   Good early progress, sleeping better. Standing tolerance still very limited    Pain after treatment: 3/10  Result of above outlined education: Verbalizes understanding and Needs reinforcement    Goals:       See eval     PLAN   Continue PT 2x/week towards LTGs, initiate standing posture training     THERAPY DAILY BILLING   Primary Insurance: [employer]  Secondary Insurance: N/A    Evaluation Procedures:  No evaluation codes were used on this date of service    Timed Procedures:  Manual Therapy, 20 minutes  Therapeutic Activity, 10 minutes  Therapeutic Exercise, 15 minutes    Untimed Procedures:  No untimed codes were used on this date of service    Total Treatment Time: 45 minutes       no...

## 2021-05-06 NOTE — OB PROVIDER H&P - ASSESSMENT
31 yo  at 30wks1 with known placenta previa admitted with first episode of VB. Patient currently hemodynamically stable with no further vaginal bleeding and reassuring fetal status.     #Placenta previa  - VB #1    - BMZ (-) for fetal lung maturity  - Will hold Mg at this time given no signficant bleeding and reassuring fetal status at this time  - BSS: ant complete placenta previa, footling breech, EARLINE wnl   - Maintain active T&S ()   - Pad counts     #Fetal wellbeing  - NST BID  - PNV, folic acid  - F/u GBS   - BMZ (-) for FLM     #Maternal wellbeing  - Reg diet  - Venodynes for DVT ppx  - F/u COVID     E Kristenirel PGY3  d/w Dr. Jorgensen

## 2021-05-06 NOTE — OB PROVIDER TRIAGE NOTE - HISTORY OF PRESENT ILLNESS
31yo  at 30w1d, known previa,  33yo  at 30w1d, known previa, presenting with first episode of vaginal bleeding. Pt states she woke up at 4am with episode of vaginal bleeding. States she soaked through her underwear and into her bed sheets, unable to quantify amount. Pt states she put on a pad and came straight here. Reports additional episodes of spotting during urination but reports pad has very minimal bleeding. Denies dizziness, lightheadedness, abdominal pain, nausea/vomiting. -CTX, -LOF, +FM     PNC: previa (unsure if complete or partial); no previous episodes of bleeding this pregnancy    OBHx: pLTCS  NR[facility] 6#6  GynHx: denies fibroids, cysts, abnormal pap smears, STId  PMH: diagnosed with pseudogout in knee this pregnancy  PSH: c/s x1  Social: denies anxiety/depression  Meds: PNV  All: NKDA

## 2021-05-06 NOTE — OB PROVIDER H&P - NSHPPHYSICALEXAM_GEN_ALL_CORE
Gen: NAD, AOx3   Resp: unlabored on RA  CV: RRR   GI: soft, nontender, gravid  Ext: no edema or calf tenderness b/l    FHT: 140, mod, + accels, - decels  [place]: no contractions    SSE: no pooling in vagina; old blood/mucus at cervical os with no active bleeding; cervix appears 0.5cm dilated  Bedside transabdominal sonogram: anterior placenta previa (appears complete), footling breech, fluid grossly normal

## 2021-05-06 NOTE — CONSULT NOTE PEDS - ATTENDING COMMENTS
I have reviewed the discussion regarding complications of prematurity with the fellow and agree with the above discussion

## 2021-05-06 NOTE — CONSULT NOTE PEDS - SUBJECTIVE AND OBJECTIVE BOX
Maternal History:  Ms. Martinez is a 31 y.o.  at 30.1 weeks with history of pseudogout of the knee who was admitted today with bleeding. Bedside sono with placenta previa and footling breech. Bleeding has since stopped. Fetal status reassuring. BMZ started today with second dose planned for tomorrow followed by discharge home should status remain stable.    NICU Consult:  Consultation held at bedside with Ms. Martinez and her mother. The following were discussed in detail with the two of them should baby require delivery today or imminently:    1. Risk of respiratory problems is still present, because lungs can be immature. Severity of the problem can range from nothing to the need for CPAP. Risk of need for intubation is low, but present.    2. Need for hypoglycemia monitoring. Initiation and advancement of feeding, benefits of breast milk.    3. Thermoregulation issues.    4. Antibiotics may be started after birth for presumed sepsis depending on the risk factors at the time.    5. Initially baby would require TPN. Feeding would be initiated once the baby is stable and will slowly progress to full feeds. Umbilical lines initially would be placed followed by PICC line for the same if she requires longer duration of TPN.    6. Jaundice and phototherapy discussed.    7. Small risk of IVH discussed.    8. ROP risk discussed.    9. Delivery room events, NICU environment, use of incubators, infection control discussed.    10. Discharge criteria, anticipated length of stay discussed.    Ms. Martinez expressed understanding of the issues mentioned. She was given the opportunity to ask questions and I answered them to the best of my knowledge.    Thank you for the consult. Please reconsult as needed.    Cliff Motta,  NICU Fellow Maternal History:  Ms. Martinez is a 31 y.o.  at 30.1 weeks with history of pseudogout of the knee who was admitted today with  vaginal bleeding. Bedside sono with placenta previa and footling breech. Bleeding has since stopped. Fetal status reassuring. BMZ started today with second dose planned for tomorrow followed by discharge home should status remain stable.    NICU Consult:  Consultation held at bedside with Ms. Martinez and her mother. The following were discussed in detail with the two of them should baby require delivery today or imminently:    1. Risk of respiratory problems is still present, because lungs can be immature. Severity of the problem can range from nothing to the need for CPAP. Risk of need for intubation is low, but present.    2. Need for hypoglycemia monitoring. Initiation and advancement of feeding, benefits of breast milk.    3. Thermoregulation issues.    4. Antibiotics may be started after birth for presumed sepsis depending on the risk factors at the time.    5. Initially baby would require TPN. Feeding would be initiated once the baby is stable and will slowly progress to full feeds. Umbilical lines initially would be placed followed by PICC line for the same if she requires longer duration of TPN.    6. Jaundice and phototherapy discussed.    7. Small risk of IVH discussed.    8. ROP risk discussed.    9. Delivery room events, NICU environment, use of incubators, infection control discussed.    10. Discharge criteria, anticipated length of stay discussed.    Ms. Martinez expressed understanding of the issues mentioned. She was given the opportunity to ask questions and I answered them to the best of my knowledge.    Thank you for the consult. Please reconsult as needed.    Cliff Motta,  NICU Fellow

## 2021-05-06 NOTE — OB RN PATIENT PROFILE - NS_PRENATALCARE_OBGYN_ALL_OB
Yes brie villagran  [address] [place] [address]  Phone: [phone]  Fax: (   )    -  Follow Up Time:

## 2021-05-07 ENCOUNTER — TRANSCRIPTION ENCOUNTER (OUTPATIENT)
Age: 31
End: 2021-05-07

## 2021-05-07 VITALS
TEMPERATURE: 98 F | OXYGEN SATURATION: 99 % | SYSTOLIC BLOOD PRESSURE: 109 MMHG | RESPIRATION RATE: 18 BRPM | DIASTOLIC BLOOD PRESSURE: 72 MMHG | HEART RATE: 91 BPM

## 2021-05-07 LAB
B PERT IGG+IGM PNL SER: ABNORMAL
COLOR FLD: YELLOW
EOSINOPHIL # FLD MANUAL: 0 %
FLUID INTAKE SUBSTANCE CLASS: NORMAL
GROUP B BETA STREP DNA (PCR): DETECTED
GROUP B BETA STREP INTERPRETATION: SIGNIFICANT CHANGE UP
LYMPHOCYTES # FLD MANUAL: 8 %
MESOTHL CELL NFR FLD: 0 %
MONOS+MACROS NFR FLD MANUAL: 86 %
NEUTS SEG # FLD MANUAL: 6 %
NRBC # FLD: 0
RBC # FLD MANUAL: 1000 /UL
SOURCE GROUP B STREP: SIGNIFICANT CHANGE UP
TOTAL CELLS COUNTED FLD: 1295 /UL
TUBE TYPE: NORMAL
UNIDENT CELLS NFR FLD MANUAL: 0 %
VARIANT LYMPHS # FLD MANUAL: 0 %

## 2021-05-07 PROCEDURE — 59025 FETAL NON-STRESS TEST: CPT | Mod: 26

## 2021-05-07 RX ORDER — HYDROCORTISONE 20 MG
100 TABLET ORAL DAILY
Refills: 0 | Status: DISCONTINUED | OUTPATIENT
Start: 2021-05-07 | End: 2021-05-07

## 2021-05-07 RX ADMIN — Medication 975 MILLIGRAM(S): at 10:47

## 2021-05-07 RX ADMIN — Medication 12 MILLIGRAM(S): at 08:04

## 2021-05-07 RX ADMIN — Medication 975 MILLIGRAM(S): at 12:03

## 2021-05-07 RX ADMIN — Medication 1 MILLIGRAM(S): at 12:05

## 2021-05-07 RX ADMIN — Medication 325 MILLIGRAM(S): at 12:06

## 2021-05-07 RX ADMIN — Medication 1 TABLET(S): at 12:06

## 2021-05-07 NOTE — PROGRESS NOTE ADULT - ASSESSMENT
32 yo  at [id number] with anterior placenta previa admitted with first vaginal bleeding (). Patient remains hemodynamically stable with no further vaginal bleeding overnight.     #Placenta previa  - Pad counts  - Maintain active T&S   - BMZ (-), due for BMZ #2 at 730AM     #Fetal wellbeing  - NST BID  - BMZ (-)   - Appreciate NICU consult   - PNV, folic acid  - F/u GBS ()     #Maternal wellbeing  - Reg diet  - COVID neg   - Venodynes for DVT ppx  - senna

## 2021-05-07 NOTE — DISCHARGE NOTE ANTEPARTUM - CARE PROVIDER_API CALL
Dionne Ventura  RESIDENCY - OBSTETRIC-GYN  [address] [place] [address]  Phone: [phone]  Fax: [phone]  Follow Up Time:

## 2021-05-07 NOTE — DISCHARGE NOTE ANTEPARTUM - CARE PLAN
Principal Discharge DX:	Vaginal bleeding in pregnancy, third trimester  Goal:	continue antepartum care  Assessment and plan of treatment:	Pt w/anterior previa s/p first episode of vaginal bleeding, now resolved. s/p BMZ x 2 (5/6-5/7)  Pelvic rest, no exercise. Pt counseled to call/come to L&D emergently if having any further bleeding, contractions, fever/chills, any other such concerning symptoms.

## 2021-05-07 NOTE — DISCHARGE NOTE ANTEPARTUM - PLAN OF CARE
continue antepartum care Pt w/anterior previa s/p first episode of vaginal bleeding, now resolved. s/p BMZ x 2 (5/6-5/7)  Pelvic rest, no exercise. Pt counseled to call/come to L&D emergently if having any further bleeding, contractions, fever/chills, any other such concerning symptoms.

## 2021-05-07 NOTE — DISCHARGE NOTE ANTEPARTUM - HOSPITAL COURSE
33 y/o P1@30.2 with anterior previa admitted with episode of vaginal bleeding overnight on 5/6. Admitted to L&D for monitoring. Pt administered BMZ (5/6-5/7) for fetal lung maturity. She was also seen by NICU for counseling during her stay. No further bleeding noted during admission, reactive NSTs and reassuring US. Pt discharged with strict precautions with close follow up in the office.

## 2021-05-07 NOTE — PROGRESS NOTE ADULT - ATTENDING COMMENTS
HD#2  Pt seen and evaluated at bedside  No overnight events, no further bleeding  pt now BMZ complete (5/6-5/7)  discussed condition w/pt. discussed possibility of bleeding, PTD, need for C/S.   pt counseled by NICU in house  NST reactive  continue pad counts, active T&S  counseled about modified activity, pelvic rest  possible discharge planning with close outpt follow up    MD Florian

## 2021-05-07 NOTE — DISCHARGE NOTE ANTEPARTUM - PATIENT PORTAL LINK FT
You can access the FollowMyHealth Patient Portal offered by [facility] by registering at the following website: [email or web address]. By joining [facility]’s FollowMyHealth portal, you will also be able to view your health information using other applications (apps) compatible with our system.

## 2021-05-07 NOTE — PROGRESS NOTE ADULT - SUBJECTIVE AND OBJECTIVE BOX
Antepartum Progress Note   HD#2     Patient seen and examined at bedside, no acute overnight events. No acute complaints. Denies further VB overnight. Denies ctx, LOF, and endorses good FM. Tolerating regular diet. Denies CP, SOB, N/V, fevers, and chills.    Vital Signs Last 24 Hours  T(C): 36.8 (05-07-21 @ 05:30), Max: 36.9 (05-07-21 @ 00:40)  HR: 91 (05-07-21 @ 05:30) (82 - 120)  BP: 107/67 (05-07-21 @ 05:30) (101/63 - 114/60)  RR: 18 (05-07-21 @ 05:30) (16 - 18)  SpO2: 97% (05-07-21 @ 05:30) (90% - 100%)      Physical Exam:  General: NAD, AOx3   Abdomen: Soft, gravid, non-tender  Ext: No pain or swelling in b/l LE       Labs:             10.3<L>  9.02  )-----------( 343      ( 05-06 @ 07:19 )             32.1<L>        MEDICATIONS  (STANDING):  betamethasone Injectable 12 milliGRAM(s) IntraMuscular once  ferrous    sulfate 325 milliGRAM(s) Oral daily  folic acid 1 milliGRAM(s) Oral daily  prenatal multivitamin 1 Tablet(s) Oral daily  senna 2 Tablet(s) Oral at bedtime    MEDICATIONS  (PRN):  acetaminophen   Tablet .. 975 milliGRAM(s) Oral every 6 hours PRN Moderate Pain (4 - 6)  diphenhydrAMINE 25 milliGRAM(s) Oral at bedtime PRN Insomnia

## 2021-05-12 LAB
CULTURE RESULTS: SIGNIFICANT CHANGE UP
SPECIMEN SOURCE: SIGNIFICANT CHANGE UP

## 2021-05-19 LAB — BACTERIA FLD CULT: NORMAL

## 2021-05-25 ENCOUNTER — OUTPATIENT (OUTPATIENT)
Dept: OUTPATIENT SERVICES | Facility: HOSPITAL | Age: 31
LOS: 1 days | End: 2021-05-25
Payer: COMMERCIAL

## 2021-05-25 VITALS — OXYGEN SATURATION: 98 % | HEART RATE: 89 BPM

## 2021-05-25 DIAGNOSIS — Z3A.00 WEEKS OF GESTATION OF PREGNANCY NOT SPECIFIED: ICD-10-CM

## 2021-05-25 DIAGNOSIS — Z98.891 HISTORY OF UTERINE SCAR FROM PREVIOUS SURGERY: Chronic | ICD-10-CM

## 2021-05-25 DIAGNOSIS — O26.899 OTHER SPECIFIED PREGNANCY RELATED CONDITIONS, UNSPECIFIED TRIMESTER: ICD-10-CM

## 2021-05-25 DIAGNOSIS — Z98.890 OTHER SPECIFIED POSTPROCEDURAL STATES: Chronic | ICD-10-CM

## 2021-05-25 LAB
APPEARANCE UR: CLEAR — SIGNIFICANT CHANGE UP
APTT BLD: 27.2 SEC — LOW (ref 27.5–35.5)
BASOPHILS # BLD AUTO: 0.02 K/UL — SIGNIFICANT CHANGE UP (ref 0–0.2)
BASOPHILS NFR BLD AUTO: 0.2 % — SIGNIFICANT CHANGE UP (ref 0–2)
BILIRUB UR-MCNC: NEGATIVE — SIGNIFICANT CHANGE UP
BLD GP AB SCN SERPL QL: NEGATIVE — SIGNIFICANT CHANGE UP
COLOR SPEC: COLORLESS — SIGNIFICANT CHANGE UP
DIFF PNL FLD: NEGATIVE — SIGNIFICANT CHANGE UP
EOSINOPHIL # BLD AUTO: 0.08 K/UL — SIGNIFICANT CHANGE UP (ref 0–0.5)
EOSINOPHIL NFR BLD AUTO: 0.9 % — SIGNIFICANT CHANGE UP (ref 0–6)
FIBRINOGEN PPP-MCNC: 653 MG/DL — HIGH (ref 290–520)
GLUCOSE UR QL: NEGATIVE — SIGNIFICANT CHANGE UP
HCT VFR BLD CALC: 34.1 % — LOW (ref 34.5–45)
HGB BLD-MCNC: 11 G/DL — LOW (ref 11.5–15.5)
IMM GRANULOCYTES NFR BLD AUTO: 1.3 % — SIGNIFICANT CHANGE UP (ref 0–1.5)
INR BLD: 0.97 RATIO — SIGNIFICANT CHANGE UP (ref 0.88–1.16)
KETONES UR-MCNC: NEGATIVE — SIGNIFICANT CHANGE UP
LEUKOCYTE ESTERASE UR-ACNC: ABNORMAL
LYMPHOCYTES # BLD AUTO: 1.99 K/UL — SIGNIFICANT CHANGE UP (ref 1–3.3)
LYMPHOCYTES # BLD AUTO: 23.5 % — SIGNIFICANT CHANGE UP (ref 13–44)
MCHC RBC-ENTMCNC: 28.2 PG — SIGNIFICANT CHANGE UP (ref 27–34)
MCHC RBC-ENTMCNC: 32.3 GM/DL — SIGNIFICANT CHANGE UP (ref 32–36)
MCV RBC AUTO: 87.4 FL — SIGNIFICANT CHANGE UP (ref 80–100)
MONOCYTES # BLD AUTO: 0.63 K/UL — SIGNIFICANT CHANGE UP (ref 0–0.9)
MONOCYTES NFR BLD AUTO: 7.4 % — SIGNIFICANT CHANGE UP (ref 2–14)
NEUTROPHILS # BLD AUTO: 5.64 K/UL — SIGNIFICANT CHANGE UP (ref 1.8–7.4)
NEUTROPHILS NFR BLD AUTO: 66.7 % — SIGNIFICANT CHANGE UP (ref 43–77)
NITRITE UR-MCNC: NEGATIVE — SIGNIFICANT CHANGE UP
NRBC # BLD: 0 /100 WBCS — SIGNIFICANT CHANGE UP (ref 0–0)
PH UR: 7 — SIGNIFICANT CHANGE UP (ref 5–8)
PLATELET # BLD AUTO: 265 K/UL — SIGNIFICANT CHANGE UP (ref 150–400)
PROT UR-MCNC: NEGATIVE — SIGNIFICANT CHANGE UP
PROTHROM AB SERPL-ACNC: 11.6 SEC — SIGNIFICANT CHANGE UP (ref 10.6–13.6)
RBC # BLD: 3.9 M/UL — SIGNIFICANT CHANGE UP (ref 3.8–5.2)
RBC # FLD: 13.3 % — SIGNIFICANT CHANGE UP (ref 10.3–14.5)
RH IG SCN BLD-IMP: POSITIVE — SIGNIFICANT CHANGE UP
SARS-COV-2 RNA SPEC QL NAA+PROBE: SIGNIFICANT CHANGE UP
SP GR SPEC: 1.01 — LOW (ref 1.01–1.02)
UROBILINOGEN FLD QL: NEGATIVE — SIGNIFICANT CHANGE UP
WBC # BLD: 8.47 K/UL — SIGNIFICANT CHANGE UP (ref 3.8–10.5)
WBC # FLD AUTO: 8.47 K/UL — SIGNIFICANT CHANGE UP (ref 3.8–10.5)

## 2021-05-25 RX ORDER — ACETAMINOPHEN 500 MG
1000 TABLET ORAL ONCE
Refills: 0 | Status: DISCONTINUED | OUTPATIENT
Start: 2021-05-25 | End: 2021-06-09

## 2021-05-25 RX ORDER — ONDANSETRON 8 MG/1
4 TABLET, FILM COATED ORAL ONCE
Refills: 0 | Status: COMPLETED | OUTPATIENT
Start: 2021-05-25 | End: 2021-05-25

## 2021-05-25 RX ORDER — SODIUM CHLORIDE 9 MG/ML
1000 INJECTION, SOLUTION INTRAVENOUS ONCE
Refills: 0 | Status: COMPLETED | OUTPATIENT
Start: 2021-05-25 | End: 2021-05-25

## 2021-05-25 RX ORDER — SODIUM CHLORIDE 9 MG/ML
1000 INJECTION, SOLUTION INTRAVENOUS
Refills: 0 | Status: DISCONTINUED | OUTPATIENT
Start: 2021-05-25 | End: 2021-06-09

## 2021-05-25 RX ORDER — NIFEDIPINE 30 MG
10 TABLET, EXTENDED RELEASE 24 HR ORAL
Refills: 0 | Status: DISCONTINUED | OUTPATIENT
Start: 2021-05-25 | End: 2021-05-26

## 2021-05-25 RX ORDER — NIFEDIPINE 30 MG
10 TABLET, EXTENDED RELEASE 24 HR ORAL EVERY 6 HOURS
Refills: 0 | Status: DISCONTINUED | OUTPATIENT
Start: 2021-05-25 | End: 2021-05-26

## 2021-05-25 RX ADMIN — ONDANSETRON 4 MILLIGRAM(S): 8 TABLET, FILM COATED ORAL at 22:52

## 2021-05-25 RX ADMIN — Medication 10 MILLIGRAM(S): at 23:04

## 2021-05-25 RX ADMIN — SODIUM CHLORIDE 1000 MILLILITER(S): 9 INJECTION, SOLUTION INTRAVENOUS at 20:10

## 2021-05-25 NOTE — PROVIDER CONTACT NOTE (MEDICATION) - SITUATION
Patient is in suspected  labor and was ordered Procardia 10mg IR x3 every 20 minutes by her OB MD Stewart.

## 2021-05-25 NOTE — PROVIDER CONTACT NOTE (MEDICATION) - ASSESSMENT
Patient's blood pressure prior to administering Procardia was 105/57 with parameters stating not to administer with blood pressure less than 110/60. As per MD Alejandro' 'provider to RN communication order', OK to administer first dose and monitor blood pressures thereafter if they meet criteria for next dose.

## 2021-05-25 NOTE — OB PROVIDER TRIAGE NOTE - NSHPPHYSICALEXAM_GEN_ALL_CORE
Gen: NAD  Cards: RRR  Pulm: CTAB  Abd: soft, non-tender, gravid  Ext: warm, well perfused, LLE >RLE in size  SSE: no bleeding, os appears closed

## 2021-05-25 NOTE — OB RN TRIAGE NOTE - NS_TRIAGEMEDICALSCREENINGPERFORMEDBY_OBGYN_ALL_OB_FT
MD Alejandro H Plasty Text: Given the location of the defect, shape of the defect and the proximity to free margins a H-plasty was deemed most appropriate for repair.  Using a sterile surgical marker, the appropriate advancement arms of the H-plasty were drawn incorporating the defect and placing the expected incisions within the relaxed skin tension lines where possible. The area thus outlined was incised deep to adipose tissue with a #15 scalpel blade. The skin margins were undermined to an appropriate distance in all directions utilizing iris scissors.  The opposing advancement arms were then advanced into place in opposite direction and anchored with interrupted buried subcutaneous sutures.

## 2021-05-25 NOTE — OB RN TRIAGE NOTE - NS_OBGYNHISTORY_OBGYN_ALL_OB_FT
C/S x1 @ 37wks for NRFHR  [facility]

## 2021-05-25 NOTE — OB PROVIDER TRIAGE NOTE - HISTORY OF PRESENT ILLNESS
33 y/o  at 32w6d presenting with regular CTX q3min. Patient had CTX yesterday as well that spaced overnight but returned this AM. Denies VB, LOF, dec FM. Patient denies chest pain, shortness of breath, fevers, chills, vomiting, urinary symptoms. Reports some mild nausea with pain as well as diarrhea. EFW 3lbs 3oz on sono 3 weeks ago. PNC c/b marginal placenta previa s/p antepartum admission for vaginal #1 with BMZ administration -. GBS positive from that time. PNC otherwise uncomplicated.     OBHx: pLTCS  NRFHT 6#6  GynHx: denies fibroids, cysts, abnormal pap smears, STId  PMH: diagnosed with pseudogout in knee this pregnancy  PSH: c/s x1  Social: denies anxiety/depression  Meds: PNV  All: NKDA    accepts blood

## 2021-05-25 NOTE — OB PROVIDER TRIAGE NOTE - PLAN OF CARE
improvement and prolongation of pregnancy negative work up for labor and infection. reassuring fetal status.   continue procardia for 24hrs. follow up in office on friday.

## 2021-05-25 NOTE — CHART NOTE - NSCHARTNOTEFT_GEN_A_CORE
R3 Eval Note    S: Patient reports CTX spacing with IV fluids though still moderately strong. Reports mild nausea. Additional ROS negative.    Vital Signs Last 24 Hrs  T(C): 36.8 (25 May 2021 20:42), Max: 36.8 (25 May 2021 20:42)  T(F): 98.2 (25 May 2021 20:42), Max: 98.2 (25 May 2021 20:42)  HR: 83 (25 May 2021 22:40) (77 - 92)  BP: 119/63 (25 May 2021 20:42) (119/63 - 119/63)  BP(mean): --  RR: 18 (25 May 2021 20:42) (18 - 18)  SpO2: 98% (25 May 2021 22:40) (91% - 100%)    Bedside sono: placenta without obvious evidence of abruption or clot    Plan:  - Procardia for tocolysis (10 IR [id number] x3 doses then q6h x3 doses)  - Hold off on BMZ for now    SAEID Alejandro PGY3  Plan per Dr. Stewart

## 2021-05-26 VITALS — SYSTOLIC BLOOD PRESSURE: 98 MMHG | DIASTOLIC BLOOD PRESSURE: 53 MMHG | HEART RATE: 84 BPM

## 2021-05-26 LAB
COVID-19 SPIKE DOMAIN AB INTERP: POSITIVE
COVID-19 SPIKE DOMAIN ANTIBODY RESULT: >250 U/ML — HIGH
SARS-COV-2 IGG+IGM SERPL QL IA: >250 U/ML — HIGH
SARS-COV-2 IGG+IGM SERPL QL IA: POSITIVE
T PALLIDUM AB TITR SER: NEGATIVE — SIGNIFICANT CHANGE UP

## 2021-05-26 PROCEDURE — 85610 PROTHROMBIN TIME: CPT

## 2021-05-26 PROCEDURE — 59025 FETAL NON-STRESS TEST: CPT

## 2021-05-26 PROCEDURE — 87635 SARS-COV-2 COVID-19 AMP PRB: CPT

## 2021-05-26 PROCEDURE — G0463: CPT

## 2021-05-26 PROCEDURE — 86900 BLOOD TYPING SEROLOGIC ABO: CPT

## 2021-05-26 PROCEDURE — 85384 FIBRINOGEN ACTIVITY: CPT

## 2021-05-26 PROCEDURE — 85025 COMPLETE CBC W/AUTO DIFF WBC: CPT

## 2021-05-26 PROCEDURE — 86769 SARS-COV-2 COVID-19 ANTIBODY: CPT

## 2021-05-26 PROCEDURE — 86850 RBC ANTIBODY SCREEN: CPT

## 2021-05-26 PROCEDURE — 85730 THROMBOPLASTIN TIME PARTIAL: CPT

## 2021-05-26 PROCEDURE — 86780 TREPONEMA PALLIDUM: CPT

## 2021-05-26 PROCEDURE — 81001 URINALYSIS AUTO W/SCOPE: CPT

## 2021-05-26 PROCEDURE — 86901 BLOOD TYPING SEROLOGIC RH(D): CPT

## 2021-05-26 RX ORDER — NIFEDIPINE 30 MG
10 TABLET, EXTENDED RELEASE 24 HR ORAL
Refills: 0 | Status: COMPLETED | OUTPATIENT
Start: 2021-05-26 | End: 2021-05-26

## 2021-05-26 RX ORDER — SODIUM CHLORIDE 9 MG/ML
500 INJECTION, SOLUTION INTRAVENOUS ONCE
Refills: 0 | Status: DISCONTINUED | OUTPATIENT
Start: 2021-05-26 | End: 2021-06-09

## 2021-05-26 RX ORDER — NIFEDIPINE 30 MG
10 TABLET, EXTENDED RELEASE 24 HR ORAL EVERY 6 HOURS
Refills: 0 | Status: DISCONTINUED | OUTPATIENT
Start: 2021-05-26 | End: 2021-06-09

## 2021-05-26 RX ORDER — NIFEDIPINE 30 MG
1 TABLET, EXTENDED RELEASE 24 HR ORAL
Qty: 4 | Refills: 0
Start: 2021-05-26

## 2021-05-26 RX ADMIN — Medication 10 MILLIGRAM(S): at 01:13

## 2021-05-26 RX ADMIN — Medication 10 MILLIGRAM(S): at 03:38

## 2021-05-26 NOTE — CHART NOTE - NSCHARTNOTEFT_GEN_A_CORE
R3 Chart Note    S: CTX improved now 4/10 from 8/10 on admission.    Vital Signs Last 24 Hrs  T(C): 36.8 (25 May 2021 20:42), Max: 36.8 (25 May 2021 20:42)  T(F): 98.2 (25 May 2021 20:42), Max: 98.2 (25 May 2021 20:42)  HR: 79 (26 May 2021 00:50) (77 - 105)  BP: 98/55 (26 May 2021 00:50) (92/50 - 119/63)  BP(mean): --  RR: 18 (25 May 2021 20:42) (18 - 18)  SpO2: 96% (26 May 2021 00:50) (91% - 100%)    Plan  - UA clean  - second dose of Procardia 10 IR now  - Continue to monitor    SAEID Alejandro PGY3  d/w Dr. Stewart

## 2021-05-26 NOTE — CHART NOTE - NSCHARTNOTEFT_GEN_A_CORE
31 y/o  at 32w6d presenting with regular CTX q3min. Patient had CTX yesterday as well that spaced overnight but returned this AM. Denies VB, LOF, dec FM. Patient denies chest pain, shortness of breath, fevers, chills, vomiting, urinary symptoms. Reports some mild nausea with pain as well as diarrhea. EFW 3lbs 3oz on sono 3 weeks ago. PNC c/b anterior marginal placenta previa s/p antepartum admission for vaginal bleeding #1 with BMZ administration -. GBS positive from that time. Prior  section. PNC otherwise uncomplicated.     OBHx: pLTCS 2019  NRFHT 6#6  GynHx: denies fibroids, cysts, abnormal pap smears, STI  PMH: diagnosed with pseudogout in knee this pregnancy  PSH: c/s x1  Social: denies anxiety/depression  Meds: PNV  All: NKDA    Evaluated on L&D triage for r/o PTL. Cervix closed on speculum exam. No vaginal bleeding. Initial contractions on monitor q 3-5 mins and painful. FHT reactive and reassuring.   VS normal. Labs normal. no leukocytosis or signs of infection. Placenta and baby normal on sono.   Patient given IV hydration with some improvement in contraction frequency and pain.   Given procardia 10mg for tocolysis. Contractions resolved with procardia. Low suspicion for PTL and repeat dosing of betamethasone was withheld.   Pain resolved and patient able to sleep.     Patient reported some leg cramping in left leg affected by psuedogout. Cramping similar to prior. Able to walk on leg. No significant edema or erythema.   Patient to monitor symptoms. If any changes would consider LE doppler repeat. Negative LE doppler in prior admission. Discussed with patient that this can be done urgently as outpatient.     Discussed work up and results with patient and . Given improvement in contractions with procardia, no concern for infection or eminent labor at this time. Fetus status reassuring.   Patient cleared for discharge home with IR procardia 10mg for 24 hrs. Pt to follow up in office on Friday. All questions and concerns addressed.     Marcos CERDA 31 y/o  at 31w6d presenting with regular CTX q3min. Patient had CTX yesterday as well that spaced overnight but returned this AM. Denies VB, LOF, dec FM. Patient denies chest pain, shortness of breath, fevers, chills, vomiting, urinary symptoms. Reports some mild nausea with pain as well as diarrhea. EFW 3lbs 3oz on sono 3 weeks ago. PNC c/b anterior marginal placenta previa s/p antepartum admission for vaginal bleeding #1 with BMZ administration -. GBS positive from that time. Prior  section. PNC otherwise uncomplicated.     OBHx: pLTCS 2019  NRFHT 6#6  GynHx: denies fibroids, cysts, abnormal pap smears, STI  PMH: diagnosed with pseudogout in knee this pregnancy  PSH: c/s x1  Social: denies anxiety/depression  Meds: PNV  All: NKDA    Evaluated on L&D triage for r/o PTL. Cervix closed on speculum exam. No vaginal bleeding. Initial contractions on monitor q 3-5 mins and painful. FHT reactive and reassuring.   VS normal. Labs normal. no leukocytosis or signs of infection. Placenta and baby normal on sono.   Patient given IV hydration with some improvement in contraction frequency and pain.   Given procardia 10mg for tocolysis. Contractions resolved with procardia. Low suspicion for PTL and repeat dosing of betamethasone was withheld.   Pain resolved and patient able to sleep.     Patient reported some leg cramping in left leg affected by psuedogout. Cramping similar to prior. Able to walk on leg. No significant edema or erythema.   Patient to monitor symptoms. If any changes would consider LE doppler repeat. Negative LE doppler in prior admission. Discussed with patient that this can be done urgently as outpatient.     Discussed work up and results with patient and . Given improvement in contractions with procardia, no concern for infection or eminent labor at this time. Fetus status reassuring.   Patient cleared for discharge home with IR procardia 10mg for 24 hrs. Pt to follow up in office on Friday. All questions and concerns addressed.     Marcos CERDA

## 2021-06-07 ENCOUNTER — TRANSCRIPTION ENCOUNTER (OUTPATIENT)
Age: 31
End: 2021-06-07

## 2021-06-08 ENCOUNTER — INPATIENT (INPATIENT)
Facility: HOSPITAL | Age: 31
LOS: 2 days | Discharge: HOME | End: 2021-06-11
Attending: OBSTETRICS & GYNECOLOGY | Admitting: OBSTETRICS & GYNECOLOGY
Payer: COMMERCIAL

## 2021-06-08 VITALS — SYSTOLIC BLOOD PRESSURE: 110 MMHG | DIASTOLIC BLOOD PRESSURE: 57 MMHG | HEART RATE: 100 BPM

## 2021-06-08 DIAGNOSIS — Z98.890 OTHER SPECIFIED POSTPROCEDURAL STATES: Chronic | ICD-10-CM

## 2021-06-08 DIAGNOSIS — O26.899 OTHER SPECIFIED PREGNANCY RELATED CONDITIONS, UNSPECIFIED TRIMESTER: ICD-10-CM

## 2021-06-08 DIAGNOSIS — Z34.80 ENCOUNTER FOR SUPERVISION OF OTHER NORMAL PREGNANCY, UNSPECIFIED TRIMESTER: ICD-10-CM

## 2021-06-08 DIAGNOSIS — Z3A.00 WEEKS OF GESTATION OF PREGNANCY NOT SPECIFIED: ICD-10-CM

## 2021-06-08 DIAGNOSIS — Z98.891 HISTORY OF UTERINE SCAR FROM PREVIOUS SURGERY: Chronic | ICD-10-CM

## 2021-06-08 LAB
APTT BLD: 25.4 SEC — LOW (ref 27.5–35.5)
FIBRINOGEN PPP-MCNC: 864 MG/DL — HIGH (ref 290–520)
HCT VFR BLD CALC: 31.6 % — LOW (ref 34.5–45)
HGB BLD-MCNC: 10.3 G/DL — LOW (ref 11.5–15.5)
INR BLD: 0.98 RATIO — SIGNIFICANT CHANGE UP (ref 0.88–1.16)
MCHC RBC-ENTMCNC: 28.5 PG — SIGNIFICANT CHANGE UP (ref 27–34)
MCHC RBC-ENTMCNC: 32.6 GM/DL — SIGNIFICANT CHANGE UP (ref 32–36)
MCV RBC AUTO: 87.5 FL — SIGNIFICANT CHANGE UP (ref 80–100)
NRBC # BLD: 0 /100 WBCS — SIGNIFICANT CHANGE UP (ref 0–0)
PLATELET # BLD AUTO: 325 K/UL — SIGNIFICANT CHANGE UP (ref 150–400)
PROTHROM AB SERPL-ACNC: 11.8 SEC — SIGNIFICANT CHANGE UP (ref 10.6–13.6)
RBC # BLD: 3.61 M/UL — LOW (ref 3.8–5.2)
RBC # FLD: 13.3 % — SIGNIFICANT CHANGE UP (ref 10.3–14.5)
SARS-COV-2 RNA SPEC QL NAA+PROBE: SIGNIFICANT CHANGE UP
WBC # BLD: 8.84 K/UL — SIGNIFICANT CHANGE UP (ref 3.8–10.5)
WBC # FLD AUTO: 8.84 K/UL — SIGNIFICANT CHANGE UP (ref 3.8–10.5)

## 2021-06-08 PROCEDURE — 86900 BLOOD TYPING SEROLOGIC ABO: CPT

## 2021-06-08 PROCEDURE — 59025 FETAL NON-STRESS TEST: CPT

## 2021-06-08 PROCEDURE — G0463: CPT

## 2021-06-08 PROCEDURE — 85027 COMPLETE CBC AUTOMATED: CPT

## 2021-06-08 PROCEDURE — 85610 PROTHROMBIN TIME: CPT

## 2021-06-08 PROCEDURE — 86769 SARS-COV-2 COVID-19 ANTIBODY: CPT

## 2021-06-08 PROCEDURE — 88307 TISSUE EXAM BY PATHOLOGIST: CPT | Mod: 26

## 2021-06-08 PROCEDURE — 86901 BLOOD TYPING SEROLOGIC RH(D): CPT

## 2021-06-08 PROCEDURE — 85384 FIBRINOGEN ACTIVITY: CPT

## 2021-06-08 PROCEDURE — 87635 SARS-COV-2 COVID-19 AMP PRB: CPT

## 2021-06-08 PROCEDURE — 87653 STREP B DNA AMP PROBE: CPT

## 2021-06-08 PROCEDURE — 86850 RBC ANTIBODY SCREEN: CPT

## 2021-06-08 PROCEDURE — 85730 THROMBOPLASTIN TIME PARTIAL: CPT

## 2021-06-08 RX ORDER — IBUPROFEN 200 MG
600 TABLET ORAL EVERY 6 HOURS
Refills: 0 | Status: COMPLETED | OUTPATIENT
Start: 2021-06-08 | End: 2022-05-07

## 2021-06-08 RX ORDER — FAMOTIDINE 10 MG/ML
20 INJECTION INTRAVENOUS ONCE
Refills: 0 | Status: DISCONTINUED | OUTPATIENT
Start: 2021-06-08 | End: 2021-06-08

## 2021-06-08 RX ORDER — DEXAMETHASONE 0.5 MG/5ML
4 ELIXIR ORAL EVERY 6 HOURS
Refills: 0 | Status: DISCONTINUED | OUTPATIENT
Start: 2021-06-08 | End: 2021-06-11

## 2021-06-08 RX ORDER — LANOLIN
1 OINTMENT (GRAM) TOPICAL EVERY 6 HOURS
Refills: 0 | Status: DISCONTINUED | OUTPATIENT
Start: 2021-06-08 | End: 2021-06-11

## 2021-06-08 RX ORDER — OXYCODONE HYDROCHLORIDE 5 MG/1
10 TABLET ORAL
Refills: 0 | Status: DISCONTINUED | OUTPATIENT
Start: 2021-06-08 | End: 2021-06-08

## 2021-06-08 RX ORDER — SODIUM CHLORIDE 9 MG/ML
1000 INJECTION, SOLUTION INTRAVENOUS
Refills: 0 | Status: DISCONTINUED | OUTPATIENT
Start: 2021-06-08 | End: 2021-06-11

## 2021-06-08 RX ORDER — OXYTOCIN 10 UNIT/ML
333.33 VIAL (ML) INJECTION
Qty: 20 | Refills: 0 | Status: DISCONTINUED | OUTPATIENT
Start: 2021-06-08 | End: 2021-06-11

## 2021-06-08 RX ORDER — KETOROLAC TROMETHAMINE 30 MG/ML
30 SYRINGE (ML) INJECTION EVERY 6 HOURS
Refills: 0 | Status: DISCONTINUED | OUTPATIENT
Start: 2021-06-08 | End: 2021-06-09

## 2021-06-08 RX ORDER — ONDANSETRON 8 MG/1
4 TABLET, FILM COATED ORAL EVERY 6 HOURS
Refills: 0 | Status: DISCONTINUED | OUTPATIENT
Start: 2021-06-08 | End: 2021-06-11

## 2021-06-08 RX ORDER — TETANUS TOXOID, REDUCED DIPHTHERIA TOXOID AND ACELLULAR PERTUSSIS VACCINE, ADSORBED 5; 2.5; 8; 8; 2.5 [IU]/.5ML; [IU]/.5ML; UG/.5ML; UG/.5ML; UG/.5ML
0.5 SUSPENSION INTRAMUSCULAR ONCE
Refills: 0 | Status: DISCONTINUED | OUTPATIENT
Start: 2021-06-08 | End: 2021-06-11

## 2021-06-08 RX ORDER — OXYCODONE HYDROCHLORIDE 5 MG/1
5 TABLET ORAL
Refills: 0 | Status: COMPLETED | OUTPATIENT
Start: 2021-06-08 | End: 2021-06-15

## 2021-06-08 RX ORDER — MORPHINE SULFATE 50 MG/1
0.1 CAPSULE, EXTENDED RELEASE ORAL ONCE
Refills: 0 | Status: DISCONTINUED | OUTPATIENT
Start: 2021-06-08 | End: 2021-06-09

## 2021-06-08 RX ORDER — SIMETHICONE 80 MG/1
80 TABLET, CHEWABLE ORAL EVERY 4 HOURS
Refills: 0 | Status: DISCONTINUED | OUTPATIENT
Start: 2021-06-08 | End: 2021-06-11

## 2021-06-08 RX ORDER — HEPARIN SODIUM 5000 [USP'U]/ML
5000 INJECTION INTRAVENOUS; SUBCUTANEOUS EVERY 12 HOURS
Refills: 0 | Status: DISCONTINUED | OUTPATIENT
Start: 2021-06-08 | End: 2021-06-11

## 2021-06-08 RX ORDER — ACETAMINOPHEN 500 MG
975 TABLET ORAL
Refills: 0 | Status: DISCONTINUED | OUTPATIENT
Start: 2021-06-08 | End: 2021-06-11

## 2021-06-08 RX ORDER — SODIUM CHLORIDE 9 MG/ML
1000 INJECTION, SOLUTION INTRAVENOUS
Refills: 0 | Status: DISCONTINUED | OUTPATIENT
Start: 2021-06-08 | End: 2021-06-08

## 2021-06-08 RX ORDER — DIPHENHYDRAMINE HCL 50 MG
25 CAPSULE ORAL EVERY 6 HOURS
Refills: 0 | Status: DISCONTINUED | OUTPATIENT
Start: 2021-06-08 | End: 2021-06-11

## 2021-06-08 RX ORDER — FERROUS SULFATE 325(65) MG
325 TABLET ORAL DAILY
Refills: 0 | Status: DISCONTINUED | OUTPATIENT
Start: 2021-06-08 | End: 2021-06-11

## 2021-06-08 RX ORDER — NALOXONE HYDROCHLORIDE 4 MG/.1ML
0.1 SPRAY NASAL
Refills: 0 | Status: DISCONTINUED | OUTPATIENT
Start: 2021-06-08 | End: 2021-06-11

## 2021-06-08 RX ORDER — OXYCODONE HYDROCHLORIDE 5 MG/1
5 TABLET ORAL ONCE
Refills: 0 | Status: DISCONTINUED | OUTPATIENT
Start: 2021-06-08 | End: 2021-06-11

## 2021-06-08 RX ORDER — SODIUM CHLORIDE 9 MG/ML
1000 INJECTION, SOLUTION INTRAVENOUS ONCE
Refills: 0 | Status: DISCONTINUED | OUTPATIENT
Start: 2021-06-08 | End: 2021-06-08

## 2021-06-08 RX ORDER — NALBUPHINE HYDROCHLORIDE 10 MG/ML
2.5 INJECTION, SOLUTION INTRAMUSCULAR; INTRAVENOUS; SUBCUTANEOUS EVERY 6 HOURS
Refills: 0 | Status: COMPLETED | OUTPATIENT
Start: 2021-06-08 | End: 2021-06-09

## 2021-06-08 RX ORDER — OXYCODONE HYDROCHLORIDE 5 MG/1
5 TABLET ORAL
Refills: 0 | Status: DISCONTINUED | OUTPATIENT
Start: 2021-06-08 | End: 2021-06-08

## 2021-06-08 RX ORDER — CITRIC ACID/SODIUM CITRATE 300-500 MG
30 SOLUTION, ORAL ORAL ONCE
Refills: 0 | Status: DISCONTINUED | OUTPATIENT
Start: 2021-06-08 | End: 2021-06-08

## 2021-06-08 RX ORDER — MAGNESIUM HYDROXIDE 400 MG/1
30 TABLET, CHEWABLE ORAL
Refills: 0 | Status: DISCONTINUED | OUTPATIENT
Start: 2021-06-08 | End: 2021-06-11

## 2021-06-08 RX ADMIN — SIMETHICONE 80 MILLIGRAM(S): 80 TABLET, CHEWABLE ORAL at 23:25

## 2021-06-08 RX ADMIN — Medication 975 MILLIGRAM(S): at 20:21

## 2021-06-08 RX ADMIN — Medication 975 MILLIGRAM(S): at 20:50

## 2021-06-08 RX ADMIN — NALBUPHINE HYDROCHLORIDE 2.5 MILLIGRAM(S): 10 INJECTION, SOLUTION INTRAMUSCULAR; INTRAVENOUS; SUBCUTANEOUS at 23:25

## 2021-06-08 RX ADMIN — HEPARIN SODIUM 5000 UNIT(S): 5000 INJECTION INTRAVENOUS; SUBCUTANEOUS at 20:21

## 2021-06-08 RX ADMIN — Medication 30 MILLIGRAM(S): at 23:25

## 2021-06-08 RX ADMIN — NALBUPHINE HYDROCHLORIDE 2.5 MILLIGRAM(S): 10 INJECTION, SOLUTION INTRAMUSCULAR; INTRAVENOUS; SUBCUTANEOUS at 17:07

## 2021-06-08 NOTE — OB PROVIDER DELIVERY SUMMARY - NSPROVIDERDELIVERYNOTE_OBGYN_ALL_OB_FT
Liveborn infant FM Apgars 7/8 wt 5#8  EBL: 1000  IVF: 2000  UOP: 600  Uncomplicated rLTCS. Intercede placed over hysterotomy. Liveborn infant FM Apgars 7/8 wt 5#8  EBL: 1000  IVF: 2000  UOP: 600  Uncomplicated rLTCS. Intercede placed over hysterotomy.    Upon hysterotomy, placenta appeared to be  and 3 cm old clot noted, possible abruption. Baby in transverse lie - internal version to vertex. Baby delivered at vertex. Placenta  easily.

## 2021-06-08 NOTE — OB RN DELIVERY SUMMARY - NSSELHIDDEN_OBGYN_ALL_OB_FT
[NS_DeliveryAttending1_OBGYN_ALL_OB_FT:[id number]] [NS_DeliveryAttending1_OBGYN_ALL_OB_FT:[id number]],[NS_DeliveryAssist1_OBGYN_ALL_OB_FT:[id number]],[NS_DeliveryRN_OBGYN_ALL_OB_FT:[id number]]

## 2021-06-08 NOTE — OB NEONATOLOGY/PEDIATRICIAN DELIVERY SUMMARY - NSPEDSNEONOTESA_OBGYN_ALL_OB_FT
This is a 33+6 week GA babygirl born to a 31 year old  mother via urgent  due to maternal bleeding with placenta previa. Mom is A+, labs negative, GBS unknown (no labor, no rupture). No significant maternal history, COVID vaccinated (partner also vaccinated), prenatal history significant for placenta previa and history of bleeding, completed beta on previous admission (, ). Mother presented with vaginal bleeding, urgently went to . OB cut through anterior placenta, baby lying transverse, required manipulation to delivery baby. Born with good tone, crying, delayed cord clamping x 30 seconds, transferred to preheated warmer, dried, suctioned and stimulated. At less than 1 minute of life started CPAP for poor color and tone. Increased CPAP to 6 and 60%, responded appropriately, saturating well, weaned to 5 and 30%. continued to have pale color but saturating well. Baby shown to mother prior to transfer to the NICU for further management of prematurity and respiratory distress. Mother plans to formula feed.

## 2021-06-08 NOTE — PRE-ANESTHESIA EVALUATION ADULT - NSANTHPMHFT_GEN_ALL_CORE
Hx of C/S - known placenta previa, anterior - no evidence of accreta/percreta  Multiple episodes of vaginal bleeding - now admitted for bleeding for urgent C/S  Denies cardiac or pulmonary disease  Denies bleeding or clotting disorders

## 2021-06-08 NOTE — OB PROVIDER H&P - NSHPPHYSICALEXAM_GEN_ALL_CORE
Vital Signs Last 24 Hrs  T(C): --  T(F): --  HR: 98 (08 Jun 2021 13:26) (96 - 104)  BP: 110/57 (08 Jun 2021 12:46) (110/57 - 110/57)  BP(mean): --  RR: --  SpO2: 99% (08 Jun 2021 13:26) (96% - 99%)    EFM: Cat 1  [place]: No Ctx    TAUS: Anterior previa seen, Breech, EARLINE wnl  SSE: +10-15cc dark pooling blood in vault, blood seen on pt's gown, no active bleeding from cervix

## 2021-06-08 NOTE — PRE-ANESTHESIA EVALUATION ADULT - NSANTHOSAYNRD_GEN_A_CORE
No. VIDHYA screening performed.  STOP BANG Legend: 0-2 = LOW Risk; 3-4 = INTERMEDIATE Risk; 5-8 = HIGH Risk

## 2021-06-08 NOTE — PRE-ANESTHESIA EVALUATION ADULT - NSANTHADDINFOFT_GEN_ALL_CORE
r/b/a discussed  all questions answered  in depth conversation regarding blood transfusion, invasive monitoring and general anesthesia with patient and spouse. Both agreed to plan and expressed understanding of risks

## 2021-06-08 NOTE — PRE-ANESTHESIA EVALUATION ADULT - NSATTENDATTESTRD_GEN_ALL_CORE
Chest x-ray single frontal view 



History: Abdominal pain. 



Comparison: None available. 



Findings: 



Moderate venous congestion. 



Right hilar prominence. 



Scattered nodularity/nodular densities in the right mid and lower 

lung zone. 



Left basilar consolidative changes. 



Small left pleural effusion. 



Calcification at the aortic knob. 



Cardiomegaly. 



Left-sided pacemaker. 



Degenerative changes in the spine and shoulders. 



Impression: 



Moderate venous congestion. 



Right hilar prominence. 



Scattered nodularity/nodular densities in the right mid and lower 

lung zone. 



Left basilar consolidative changes. 



Small left pleural effusion. 



Calcification at the aortic knob. 



Cardiomegaly.
The patient has been re-examined and I agree with the above assessment or I updated with my findings.

## 2021-06-08 NOTE — OB PROVIDER H&P - HISTORY OF PRESENT ILLNESS
The patient is a 31YO  @ 33.6wks presenting w/ episode of vaginal bleeding in the setting of an anterior placenta previa. The patient said it started approximately 1130am. She could not identify inciting events. She said that she had a large gush of blood and she thought she broke her water. She denies HA, dizziness, lightheadedness, CP, SOB, palpitations, abd pain. +VB, +FM. GBS+ She received BMZ ON - for first episode of bleeding.    PNC: Anterior placenta previa  OBHx: pLTCS  [facility] 6#6  GynHx: denies fibroids, cysts, abnormal pap smears, STId  PMH: diagnosed with pseudogout in knee this pregnancy  PSH: c/s x1  Social: denies anxiety/depression  Meds: PNV  All: NKDA

## 2021-06-08 NOTE — OB PROVIDER H&P - ATTENDING COMMENTS
P1 at 33 weeks 6 days, with vaginal bleeding -pt report large gush, thought broke her water - upon exam active bleeding  sono shows anterior marginal previa, good fluid, no evidence of SROM on spec, ferning negative  fetus cat 1 tracing  as now 33 weeks 6 days with second episode of significant bleed sp betamethasone /  rec. delivery as active bleeding with anterior marginal previa  ho prior c/s - last sono 3 weeks ago efw 3.13lb  anterior marginal previa, placental/myometrial interface appears normal, no lacunae  will proceed with repeat c/s  cross for 2 units  as active bleeding will proceed with urgent  section now  gbs pos  nicu fellow counselled pt and   bloods now  [place]

## 2021-06-08 NOTE — OB PROVIDER H&P - ASSESSMENT
The patient is a 32YO  @ 33.6wks presenting w/ VB in the setting of a placenta previa.   -Admit to L&D, STAT routine labs, IVF  -NPO (last ate a banana  on her way to the hospital)- WIll not wait for NPO  -Emergent rLTCS for bleeding previa  -2uPRBC uncrossed on hold for OR  -Monitor VS  -EFM + Kramer cat 1  -NICU consult  -Anesthesia consult  -s/p JONNIE -7    radu pgy3 d/w & e/w Dr. Aceves

## 2021-06-09 LAB
BASOPHILS # BLD AUTO: 0.01 K/UL — SIGNIFICANT CHANGE UP (ref 0–0.2)
BASOPHILS NFR BLD AUTO: 0.1 % — SIGNIFICANT CHANGE UP (ref 0–2)
COVID-19 SPIKE DOMAIN AB INTERP: POSITIVE
COVID-19 SPIKE DOMAIN ANTIBODY RESULT: >250 U/ML — HIGH
EOSINOPHIL # BLD AUTO: 0.03 K/UL — SIGNIFICANT CHANGE UP (ref 0–0.5)
EOSINOPHIL NFR BLD AUTO: 0.2 % — SIGNIFICANT CHANGE UP (ref 0–6)
HCT VFR BLD CALC: 27.1 % — LOW (ref 34.5–45)
HGB BLD-MCNC: 8.7 G/DL — LOW (ref 11.5–15.5)
IMM GRANULOCYTES NFR BLD AUTO: 1.7 % — HIGH (ref 0–1.5)
LYMPHOCYTES # BLD AUTO: 1.62 K/UL — SIGNIFICANT CHANGE UP (ref 1–3.3)
LYMPHOCYTES # BLD AUTO: 13 % — SIGNIFICANT CHANGE UP (ref 13–44)
MCHC RBC-ENTMCNC: 28 PG — SIGNIFICANT CHANGE UP (ref 27–34)
MCHC RBC-ENTMCNC: 32.1 GM/DL — SIGNIFICANT CHANGE UP (ref 32–36)
MCV RBC AUTO: 87.1 FL — SIGNIFICANT CHANGE UP (ref 80–100)
MONOCYTES # BLD AUTO: 0.9 K/UL — SIGNIFICANT CHANGE UP (ref 0–0.9)
MONOCYTES NFR BLD AUTO: 7.2 % — SIGNIFICANT CHANGE UP (ref 2–14)
NEUTROPHILS # BLD AUTO: 9.67 K/UL — HIGH (ref 1.8–7.4)
NEUTROPHILS NFR BLD AUTO: 77.8 % — HIGH (ref 43–77)
NRBC # BLD: 0 /100 WBCS — SIGNIFICANT CHANGE UP (ref 0–0)
PLATELET # BLD AUTO: 285 K/UL — SIGNIFICANT CHANGE UP (ref 150–400)
RBC # BLD: 3.11 M/UL — LOW (ref 3.8–5.2)
RBC # FLD: 13.3 % — SIGNIFICANT CHANGE UP (ref 10.3–14.5)
SARS-COV-2 IGG+IGM SERPL QL IA: >250 U/ML — HIGH
SARS-COV-2 IGG+IGM SERPL QL IA: POSITIVE
T PALLIDUM AB TITR SER: NEGATIVE — SIGNIFICANT CHANGE UP
WBC # BLD: 12.44 K/UL — HIGH (ref 3.8–10.5)
WBC # FLD AUTO: 12.44 K/UL — HIGH (ref 3.8–10.5)

## 2021-06-09 RX ORDER — ASCORBIC ACID 60 MG
500 TABLET,CHEWABLE ORAL THREE TIMES A DAY
Refills: 0 | Status: DISCONTINUED | OUTPATIENT
Start: 2021-06-09 | End: 2021-06-11

## 2021-06-09 RX ORDER — OXYCODONE HYDROCHLORIDE 5 MG/1
5 TABLET ORAL
Refills: 0 | Status: DISCONTINUED | OUTPATIENT
Start: 2021-06-09 | End: 2021-06-11

## 2021-06-09 RX ORDER — FERROUS SULFATE 325(65) MG
325 TABLET ORAL THREE TIMES A DAY
Refills: 0 | Status: DISCONTINUED | OUTPATIENT
Start: 2021-06-09 | End: 2021-06-11

## 2021-06-09 RX ORDER — OXYCODONE HYDROCHLORIDE 5 MG/1
5 TABLET ORAL ONCE
Refills: 0 | Status: DISCONTINUED | OUTPATIENT
Start: 2021-06-09 | End: 2021-06-11

## 2021-06-09 RX ORDER — IBUPROFEN 200 MG
600 TABLET ORAL EVERY 6 HOURS
Refills: 0 | Status: DISCONTINUED | OUTPATIENT
Start: 2021-06-09 | End: 2021-06-11

## 2021-06-09 RX ADMIN — NALBUPHINE HYDROCHLORIDE 2.5 MILLIGRAM(S): 10 INJECTION, SOLUTION INTRAMUSCULAR; INTRAVENOUS; SUBCUTANEOUS at 14:29

## 2021-06-09 RX ADMIN — Medication 1 TABLET(S): at 11:49

## 2021-06-09 RX ADMIN — Medication 500 MILLIGRAM(S): at 20:50

## 2021-06-09 RX ADMIN — SIMETHICONE 80 MILLIGRAM(S): 80 TABLET, CHEWABLE ORAL at 03:29

## 2021-06-09 RX ADMIN — Medication 975 MILLIGRAM(S): at 20:39

## 2021-06-09 RX ADMIN — Medication 600 MILLIGRAM(S): at 23:40

## 2021-06-09 RX ADMIN — MAGNESIUM HYDROXIDE 30 MILLILITER(S): 400 TABLET, CHEWABLE ORAL at 22:04

## 2021-06-09 RX ADMIN — NALBUPHINE HYDROCHLORIDE 2.5 MILLIGRAM(S): 10 INJECTION, SOLUTION INTRAMUSCULAR; INTRAVENOUS; SUBCUTANEOUS at 06:00

## 2021-06-09 RX ADMIN — Medication 975 MILLIGRAM(S): at 09:04

## 2021-06-09 RX ADMIN — Medication 30 MILLIGRAM(S): at 18:00

## 2021-06-09 RX ADMIN — Medication 975 MILLIGRAM(S): at 18:10

## 2021-06-09 RX ADMIN — Medication 30 MILLIGRAM(S): at 14:27

## 2021-06-09 RX ADMIN — Medication 30 MILLIGRAM(S): at 06:00

## 2021-06-09 RX ADMIN — OXYCODONE HYDROCHLORIDE 5 MILLIGRAM(S): 5 TABLET ORAL at 18:02

## 2021-06-09 RX ADMIN — SIMETHICONE 80 MILLIGRAM(S): 80 TABLET, CHEWABLE ORAL at 14:55

## 2021-06-09 RX ADMIN — Medication 30 MILLIGRAM(S): at 11:50

## 2021-06-09 RX ADMIN — Medication 30 MILLIGRAM(S): at 18:10

## 2021-06-09 RX ADMIN — NALBUPHINE HYDROCHLORIDE 2.5 MILLIGRAM(S): 10 INJECTION, SOLUTION INTRAMUSCULAR; INTRAVENOUS; SUBCUTANEOUS at 05:28

## 2021-06-09 RX ADMIN — HEPARIN SODIUM 5000 UNIT(S): 5000 INJECTION INTRAVENOUS; SUBCUTANEOUS at 20:39

## 2021-06-09 RX ADMIN — NALBUPHINE HYDROCHLORIDE 2.5 MILLIGRAM(S): 10 INJECTION, SOLUTION INTRAMUSCULAR; INTRAVENOUS; SUBCUTANEOUS at 11:49

## 2021-06-09 RX ADMIN — Medication 975 MILLIGRAM(S): at 11:51

## 2021-06-09 RX ADMIN — OXYCODONE HYDROCHLORIDE 5 MILLIGRAM(S): 5 TABLET ORAL at 18:11

## 2021-06-09 RX ADMIN — Medication 975 MILLIGRAM(S): at 02:49

## 2021-06-09 RX ADMIN — Medication 975 MILLIGRAM(S): at 21:16

## 2021-06-09 RX ADMIN — Medication 325 MILLIGRAM(S): at 20:50

## 2021-06-09 RX ADMIN — HEPARIN SODIUM 5000 UNIT(S): 5000 INJECTION INTRAVENOUS; SUBCUTANEOUS at 09:04

## 2021-06-09 RX ADMIN — Medication 30 MILLIGRAM(S): at 00:00

## 2021-06-09 RX ADMIN — Medication 500 MILLIGRAM(S): at 14:55

## 2021-06-09 RX ADMIN — Medication 30 MILLIGRAM(S): at 05:28

## 2021-06-09 RX ADMIN — Medication 325 MILLIGRAM(S): at 14:55

## 2021-06-09 RX ADMIN — Medication 975 MILLIGRAM(S): at 14:56

## 2021-06-09 RX ADMIN — NALBUPHINE HYDROCHLORIDE 2.5 MILLIGRAM(S): 10 INJECTION, SOLUTION INTRAMUSCULAR; INTRAVENOUS; SUBCUTANEOUS at 00:00

## 2021-06-09 RX ADMIN — Medication 975 MILLIGRAM(S): at 03:20

## 2021-06-09 NOTE — PROGRESS NOTE ADULT - SUBJECTIVE AND OBJECTIVE BOX
Patient seen and examined at bedside, no acute overnight events. No acute complaints, pain well controlled. Patient is ambulating and tolerating regular diet. Has not yet passed flatus. Denies CP, SOB, N/V, HA, blurred vision, epigastric pain.    Vital Signs Last 24 Hours  T(C): 36.6 (06-09-21 @ 05:42), Max: 36.8 (06-08-21 @ 15:15)  HR: 65 (06-09-21 @ 05:42) (60 - 104)  BP: 94/61 (06-09-21 @ 05:42) (85/51 - 110/57)  RR: 18 (06-09-21 @ 05:42) (16 - 18)  SpO2: 99% (06-09-21 @ 05:42) (96% - 100%)    I&O's Summary    08 Jun 2021 07:01  -  09 Jun 2021 07:00  --------------------------------------------------------  IN: 0 mL / OUT: 3125 mL / NET: -3125 mL        Physical Exam:  General: NAD  Abdomen: Soft, expected tenderness, non-distended, fundus firm  Incision: Pfannenstiel incision CDI, steristrips in place  Pelvic: Lochia wnl    Labs:    Blood Type: A Positive  Antibody Screen: Negative  RPR: Negative               10.3   8.84  )-----------( 325      ( 06-08 @ 14:22 )             31.6                11.0   8.47  )-----------( 265      ( 05-25 @ 20:27 )             34.1         MEDICATIONS  (STANDING):  acetaminophen   Tablet .. 975 milliGRAM(s) Oral <User Schedule>  diphtheria/tetanus/pertussis (acellular) Vaccine (ADAcel) 0.5 milliLiter(s) IntraMuscular once  ferrous    sulfate 325 milliGRAM(s) Oral daily  heparin   Injectable 5000 Unit(s) SubCutaneous every 12 hours  ibuprofen  Tablet. 600 milliGRAM(s) Oral every 6 hours  ketorolac   Injectable 30 milliGRAM(s) IV Push every 6 hours  lactated ringers. 1000 milliLiter(s) (125 mL/Hr) IV Continuous <Continuous>  morphine PF Spinal 0.1 milliGRAM(s) IntraThecal. once  oxytocin Infusion 333.333 milliUNIT(s)/Min (1000 mL/Hr) IV Continuous <Continuous>  oxytocin Infusion 333.333 milliUNIT(s)/Min (1000 mL/Hr) IV Continuous <Continuous>  prenatal multivitamin 1 Tablet(s) Oral daily    MEDICATIONS  (PRN):  dexAMETHasone  Injectable 4 milliGRAM(s) IV Push every 6 hours PRN Nausea  diphenhydrAMINE 25 milliGRAM(s) Oral every 6 hours PRN Pruritus  lanolin Ointment 1 Application(s) Topical every 6 hours PRN Sore Nipples  magnesium hydroxide Suspension 30 milliLiter(s) Oral two times a day PRN Constipation  nalbuphine Injectable 2.5 milliGRAM(s) IV Push every 6 hours PRN Pruritus  naloxone Injectable 0.1 milliGRAM(s) IV Push every 3 minutes PRN For ANY of the following changes in patient status:  A. Breaths Per Minute LESS THAN 10, B. Oxygen saturation LESS THAN 90%, C. Sedation score of 6 for Stop After: 4 Times  ondansetron Injectable 4 milliGRAM(s) IV Push every 6 hours PRN Nausea  oxyCODONE    IR 5 milliGRAM(s) Oral every 3 hours PRN Mild Pain (1 - 3)  oxyCODONE    IR 10 milliGRAM(s) Oral every 3 hours PRN Moderate Pain (4 - 6)  oxyCODONE    IR 5 milliGRAM(s) Oral every 3 hours PRN Moderate to Severe Pain (4-10)  oxyCODONE    IR 5 milliGRAM(s) Oral once PRN Moderate to Severe Pain (4-10)  simethicone 80 milliGRAM(s) Chew every 4 hours PRN Gas

## 2021-06-09 NOTE — PROGRESS NOTE ADULT - SUBJECTIVE AND OBJECTIVE BOX
Day 1 of Anesthesia Pain Management Service    SUBJECTIVE: Doing ok  Pain Scale Score:          [X] Refer to charted pain scores    THERAPY:    s/p   100  mcg PF morphine on 6\8\2021      MEDICATIONS  (STANDING):  acetaminophen   Tablet .. 975 milliGRAM(s) Oral <User Schedule>  ascorbic acid 500 milliGRAM(s) Oral three times a day  diphtheria/tetanus/pertussis (acellular) Vaccine (ADAcel) 0.5 milliLiter(s) IntraMuscular once  ferrous    sulfate 325 milliGRAM(s) Oral daily  ferrous    sulfate 325 milliGRAM(s) Oral three times a day  heparin   Injectable 5000 Unit(s) SubCutaneous every 12 hours  ibuprofen  Tablet. 600 milliGRAM(s) Oral every 6 hours  ketorolac   Injectable 30 milliGRAM(s) IV Push every 6 hours  lactated ringers. 1000 milliLiter(s) (125 mL/Hr) IV Continuous <Continuous>  morphine PF Spinal 0.1 milliGRAM(s) IntraThecal. once  oxytocin Infusion 333.333 milliUNIT(s)/Min (1000 mL/Hr) IV Continuous <Continuous>  oxytocin Infusion 333.333 milliUNIT(s)/Min (1000 mL/Hr) IV Continuous <Continuous>  prenatal multivitamin 1 Tablet(s) Oral daily    MEDICATIONS  (PRN):  dexAMETHasone  Injectable 4 milliGRAM(s) IV Push every 6 hours PRN Nausea  diphenhydrAMINE 25 milliGRAM(s) Oral every 6 hours PRN Pruritus  lanolin Ointment 1 Application(s) Topical every 6 hours PRN Sore Nipples  magnesium hydroxide Suspension 30 milliLiter(s) Oral two times a day PRN Constipation  nalbuphine Injectable 2.5 milliGRAM(s) IV Push every 6 hours PRN Pruritus  naloxone Injectable 0.1 milliGRAM(s) IV Push every 3 minutes PRN For ANY of the following changes in patient status:  A. Breaths Per Minute LESS THAN 10, B. Oxygen saturation LESS THAN 90%, C. Sedation score of 6 for Stop After: 4 Times  ondansetron Injectable 4 milliGRAM(s) IV Push every 6 hours PRN Nausea  oxyCODONE    IR 5 milliGRAM(s) Oral every 3 hours PRN Mild Pain (1 - 3)  oxyCODONE    IR 10 milliGRAM(s) Oral every 3 hours PRN Moderate Pain (4 - 6)  oxyCODONE    IR 5 milliGRAM(s) Oral every 3 hours PRN Moderate to Severe Pain (4-10)  oxyCODONE    IR 5 milliGRAM(s) Oral once PRN Moderate to Severe Pain (4-10)  simethicone 80 milliGRAM(s) Chew every 4 hours PRN Gas      OBJECTIVE:    Sedation:        	[X] Alert	 [ ] Drowsy	[ ] Arousable      [ ] Asleep       [ ] Unresponsive    Side Effects:	[ ] None 	[ ] Nausea	[ ] Vomiting         [X ] Pruritus: Nubain prn  		[ ] Weakness            [ ] Numbness	          [ ] Other:    Vital Signs Last 24 Hrs  T(C): 36.4 (09 Jun 2021 08:43), Max: 36.8 (08 Jun 2021 15:15)  T(F): 97.6 (09 Jun 2021 08:43), Max: 98.2 (08 Jun 2021 15:15)  HR: 71 (09 Jun 2021 08:43) (60 - 104)  BP: 97/66 (09 Jun 2021 08:43) (85/51 - 110/57)  BP(mean): 72 (08 Jun 2021 17:30) (63 - 76)  RR: 18 (09 Jun 2021 08:43) (16 - 18)  SpO2: 100% (09 Jun 2021 08:43) (96% - 100%)    ASSESSMENT/ PLAN  [X] Patient to be transitioned to prn analgesics later today  [X] Pain management per primary service, pain service to sign off   [X]Documentation and Verification of current medications Day 1 of Anesthesia Pain Management Service    SUBJECTIVE: Doing ok, some shoulder pain  Pain Scale Score:          [X] Refer to charted pain scores    THERAPY:    s/p   100  mcg PF morphine on 6\8\2021      MEDICATIONS  (STANDING):  acetaminophen   Tablet .. 975 milliGRAM(s) Oral <User Schedule>  ascorbic acid 500 milliGRAM(s) Oral three times a day  diphtheria/tetanus/pertussis (acellular) Vaccine (ADAcel) 0.5 milliLiter(s) IntraMuscular once  ferrous    sulfate 325 milliGRAM(s) Oral daily  ferrous    sulfate 325 milliGRAM(s) Oral three times a day  heparin   Injectable 5000 Unit(s) SubCutaneous every 12 hours  ibuprofen  Tablet. 600 milliGRAM(s) Oral every 6 hours  ketorolac   Injectable 30 milliGRAM(s) IV Push every 6 hours  lactated ringers. 1000 milliLiter(s) (125 mL/Hr) IV Continuous <Continuous>  morphine PF Spinal 0.1 milliGRAM(s) IntraThecal. once  oxytocin Infusion 333.333 milliUNIT(s)/Min (1000 mL/Hr) IV Continuous <Continuous>  oxytocin Infusion 333.333 milliUNIT(s)/Min (1000 mL/Hr) IV Continuous <Continuous>  prenatal multivitamin 1 Tablet(s) Oral daily    MEDICATIONS  (PRN):  dexAMETHasone  Injectable 4 milliGRAM(s) IV Push every 6 hours PRN Nausea  diphenhydrAMINE 25 milliGRAM(s) Oral every 6 hours PRN Pruritus  lanolin Ointment 1 Application(s) Topical every 6 hours PRN Sore Nipples  magnesium hydroxide Suspension 30 milliLiter(s) Oral two times a day PRN Constipation  nalbuphine Injectable 2.5 milliGRAM(s) IV Push every 6 hours PRN Pruritus  naloxone Injectable 0.1 milliGRAM(s) IV Push every 3 minutes PRN For ANY of the following changes in patient status:  A. Breaths Per Minute LESS THAN 10, B. Oxygen saturation LESS THAN 90%, C. Sedation score of 6 for Stop After: 4 Times  ondansetron Injectable 4 milliGRAM(s) IV Push every 6 hours PRN Nausea  oxyCODONE    IR 5 milliGRAM(s) Oral every 3 hours PRN Mild Pain (1 - 3)  oxyCODONE    IR 10 milliGRAM(s) Oral every 3 hours PRN Moderate Pain (4 - 6)  oxyCODONE    IR 5 milliGRAM(s) Oral every 3 hours PRN Moderate to Severe Pain (4-10)  oxyCODONE    IR 5 milliGRAM(s) Oral once PRN Moderate to Severe Pain (4-10)  simethicone 80 milliGRAM(s) Chew every 4 hours PRN Gas      OBJECTIVE:    Sedation:        	[X] Alert	 [ ] Drowsy	[ ] Arousable      [ ] Asleep       [ ] Unresponsive    Side Effects:	[ ] None 	[ ] Nausea	[ ] Vomiting         [X ] Pruritus: Nubain prn  		[ ] Weakness            [ ] Numbness	          [ ] Other:    Vital Signs Last 24 Hrs  T(C): 36.4 (09 Jun 2021 08:43), Max: 36.8 (08 Jun 2021 15:15)  T(F): 97.6 (09 Jun 2021 08:43), Max: 98.2 (08 Jun 2021 15:15)  HR: 71 (09 Jun 2021 08:43) (60 - 104)  BP: 97/66 (09 Jun 2021 08:43) (85/51 - 110/57)  BP(mean): 72 (08 Jun 2021 17:30) (63 - 76)  RR: 18 (09 Jun 2021 08:43) (16 - 18)  SpO2: 100% (09 Jun 2021 08:43) (96% - 100%)    ASSESSMENT/ PLAN: Warm packs to shoulder prn  [X] Patient to be transitioned to prn analgesics later today  [X] Pain management per primary service, pain service to sign off   [X]Documentation and Verification of current medications

## 2021-06-09 NOTE — CHART NOTE - NSCHARTNOTEFT_GEN_A_CORE
PA NOTE     POD#1         Vital Signs Last 24 Hrs  T(C): 36.6 (2021 05:42), Max: 36.8 (2021 15:15)  T(F): 97.9 (2021 05:42), Max: 98.2 (2021 15:15)  HR: 65 (2021 05:42) (60 - 104)  BP: 94/61 (2021 05:42) (85/51 - 110/57)  BP(mean): 72 (2021 17:30) (63 - 76)  RR: 18 (2021 05:42) (16 - 18)  SpO2: 99% (2021 05:42) (96% - 100%)               8.7    12.44 )-----------( 285      (  @ 06:55 )             27.1                10.3   8.84  )-----------( 325      ( 08 @ 14:22 )             31.6           Assessment: Anemia secondary to acute blood loss due to delivery    Plan:  - Ferrous Sulfate, Vitamin C supplementation.  - Monitor for signs/symptoms of anemia.   - Does not require transfusion at this time

## 2021-06-10 RX ORDER — PSYLLIUM SEED (WITH DEXTROSE)
1 POWDER (GRAM) ORAL DAILY
Refills: 0 | Status: DISCONTINUED | OUTPATIENT
Start: 2021-06-10 | End: 2021-06-10

## 2021-06-10 RX ORDER — POLYETHYLENE GLYCOL 3350 17 G/17G
17 POWDER, FOR SOLUTION ORAL ONCE
Refills: 0 | Status: COMPLETED | OUTPATIENT
Start: 2021-06-10 | End: 2021-06-10

## 2021-06-10 RX ORDER — PSYLLIUM SEED (WITH DEXTROSE)
1 POWDER (GRAM) ORAL DAILY
Refills: 0 | Status: DISCONTINUED | OUTPATIENT
Start: 2021-06-10 | End: 2021-06-11

## 2021-06-10 RX ADMIN — HEPARIN SODIUM 5000 UNIT(S): 5000 INJECTION INTRAVENOUS; SUBCUTANEOUS at 08:20

## 2021-06-10 RX ADMIN — OXYCODONE HYDROCHLORIDE 5 MILLIGRAM(S): 5 TABLET ORAL at 08:24

## 2021-06-10 RX ADMIN — Medication 600 MILLIGRAM(S): at 00:24

## 2021-06-10 RX ADMIN — SIMETHICONE 80 MILLIGRAM(S): 80 TABLET, CHEWABLE ORAL at 17:12

## 2021-06-10 RX ADMIN — POLYETHYLENE GLYCOL 3350 17 GRAM(S): 17 POWDER, FOR SOLUTION ORAL at 00:41

## 2021-06-10 RX ADMIN — Medication 975 MILLIGRAM(S): at 08:20

## 2021-06-10 RX ADMIN — OXYCODONE HYDROCHLORIDE 5 MILLIGRAM(S): 5 TABLET ORAL at 09:00

## 2021-06-10 RX ADMIN — Medication 1 TABLET(S): at 12:55

## 2021-06-10 RX ADMIN — HEPARIN SODIUM 5000 UNIT(S): 5000 INJECTION INTRAVENOUS; SUBCUTANEOUS at 20:38

## 2021-06-10 RX ADMIN — Medication 500 MILLIGRAM(S): at 15:32

## 2021-06-10 RX ADMIN — Medication 975 MILLIGRAM(S): at 09:00

## 2021-06-10 RX ADMIN — Medication 600 MILLIGRAM(S): at 06:14

## 2021-06-10 RX ADMIN — Medication 600 MILLIGRAM(S): at 05:24

## 2021-06-10 RX ADMIN — Medication 975 MILLIGRAM(S): at 03:46

## 2021-06-10 RX ADMIN — Medication 600 MILLIGRAM(S): at 18:00

## 2021-06-10 RX ADMIN — Medication 975 MILLIGRAM(S): at 15:31

## 2021-06-10 RX ADMIN — Medication 600 MILLIGRAM(S): at 12:55

## 2021-06-10 RX ADMIN — OXYCODONE HYDROCHLORIDE 5 MILLIGRAM(S): 5 TABLET ORAL at 00:48

## 2021-06-10 RX ADMIN — MAGNESIUM HYDROXIDE 30 MILLILITER(S): 400 TABLET, CHEWABLE ORAL at 17:13

## 2021-06-10 RX ADMIN — Medication 500 MILLIGRAM(S): at 05:24

## 2021-06-10 RX ADMIN — Medication 600 MILLIGRAM(S): at 13:30

## 2021-06-10 RX ADMIN — Medication 975 MILLIGRAM(S): at 20:42

## 2021-06-10 RX ADMIN — Medication 600 MILLIGRAM(S): at 23:19

## 2021-06-10 RX ADMIN — Medication 600 MILLIGRAM(S): at 17:11

## 2021-06-10 RX ADMIN — Medication 1 PACKET(S): at 12:58

## 2021-06-10 RX ADMIN — Medication 1 ENEMA: at 22:07

## 2021-06-10 RX ADMIN — Medication 975 MILLIGRAM(S): at 16:00

## 2021-06-10 RX ADMIN — SIMETHICONE 80 MILLIGRAM(S): 80 TABLET, CHEWABLE ORAL at 21:35

## 2021-06-10 RX ADMIN — Medication 975 MILLIGRAM(S): at 03:02

## 2021-06-10 RX ADMIN — Medication 325 MILLIGRAM(S): at 15:32

## 2021-06-10 RX ADMIN — Medication 600 MILLIGRAM(S): at 23:30

## 2021-06-10 RX ADMIN — OXYCODONE HYDROCHLORIDE 5 MILLIGRAM(S): 5 TABLET ORAL at 01:22

## 2021-06-10 RX ADMIN — Medication 325 MILLIGRAM(S): at 12:55

## 2021-06-10 RX ADMIN — Medication 325 MILLIGRAM(S): at 05:25

## 2021-06-10 RX ADMIN — Medication 975 MILLIGRAM(S): at 20:37

## 2021-06-10 NOTE — PROVIDER CONTACT NOTE (OTHER) - SITUATION
pt c/o increased  incisional pain right sided rib pain rt shoulder gas pain and inability to move bowels

## 2021-06-10 NOTE — PROGRESS NOTE ADULT - SUBJECTIVE AND OBJECTIVE BOX
Patient seen and examined at bedside, no acute overnight events. No acute concerns, pain well controlled. Patient is ambulating, voiding spontaneously, passing flatus, and tolerating regular diet. Denies CP, SOB, N/V, HA, blurred vision, epigastric pain.    Vital Signs Last 24 Hours  T(C): 36.7 (06-09-21 @ 20:44), Max: 36.7 (06-09-21 @ 12:12)  HR: 84 (06-09-21 @ 20:44) (65 - 84)  BP: 95/62 (06-09-21 @ 20:44) (92/59 - 97/66)  RR: 18 (06-09-21 @ 20:44) (18 - 18)  SpO2: 97% (06-09-21 @ 20:44) (97% - 100%)    Physical Exam:  General: NAD  Abdomen: Soft, non-tender, non-distended, fundus firm  Incision: Pfannensteil incision CDI, subcuticular suture closure  Pelvic: Lochia wnl    Labs:    Blood Type: A Positive  Antibody Screen: Negative  RPR: Negative               8.7    12.44 )-----------( 285      ( 06-09 @ 06:55 )             27.1                10.3   8.84  )-----------( 325      ( 06-08 @ 14:22 )             31.6                11.0   8.47  )-----------( 265      ( 05-25 @ 20:27 )             34.1         MEDICATIONS  (STANDING):  acetaminophen   Tablet .. 975 milliGRAM(s) Oral <User Schedule>  ascorbic acid 500 milliGRAM(s) Oral three times a day  diphtheria/tetanus/pertussis (acellular) Vaccine (ADAcel) 0.5 milliLiter(s) IntraMuscular once  ferrous    sulfate 325 milliGRAM(s) Oral daily  ferrous    sulfate 325 milliGRAM(s) Oral three times a day  heparin   Injectable 5000 Unit(s) SubCutaneous every 12 hours  ibuprofen  Tablet. 600 milliGRAM(s) Oral every 6 hours  lactated ringers. 1000 milliLiter(s) (125 mL/Hr) IV Continuous <Continuous>  oxytocin Infusion 333.333 milliUNIT(s)/Min (1000 mL/Hr) IV Continuous <Continuous>  oxytocin Infusion 333.333 milliUNIT(s)/Min (1000 mL/Hr) IV Continuous <Continuous>  prenatal multivitamin 1 Tablet(s) Oral daily  psyllium Powder 1 Packet(s) Oral daily    MEDICATIONS  (PRN):  dexAMETHasone  Injectable 4 milliGRAM(s) IV Push every 6 hours PRN Nausea  diphenhydrAMINE 25 milliGRAM(s) Oral every 6 hours PRN Pruritus  lanolin Ointment 1 Application(s) Topical every 6 hours PRN Sore Nipples  magnesium hydroxide Suspension 30 milliLiter(s) Oral two times a day PRN Constipation  naloxone Injectable 0.1 milliGRAM(s) IV Push every 3 minutes PRN For ANY of the following changes in patient status:  A. Breaths Per Minute LESS THAN 10, B. Oxygen saturation LESS THAN 90%, C. Sedation score of 6 for Stop After: 4 Times  ondansetron Injectable 4 milliGRAM(s) IV Push every 6 hours PRN Nausea  oxyCODONE    IR 5 milliGRAM(s) Oral every 3 hours PRN Moderate to Severe Pain (4-10)  oxyCODONE    IR 5 milliGRAM(s) Oral once PRN Moderate to Severe Pain (4-10)  oxyCODONE    IR 5 milliGRAM(s) Oral once PRN Moderate to Severe Pain (4-10)  simethicone 80 milliGRAM(s) Chew every 4 hours PRN Gas

## 2021-06-10 NOTE — PROGRESS NOTE ADULT - ATTENDING COMMENTS
See above
I have  examined this pt and I agree with the clinical plan  baby stable   plan dc 6/11
POD#1 s/p emergency C/S for bleeding in setting of placenta previa, likely abruption  VS stable, voiding spontaneously  H/H 10.3/31.6 -> 8.7/27.1; drop appropriate  continue postpartum/postop care    MD Florian

## 2021-06-10 NOTE — PROVIDER CONTACT NOTE (OTHER) - RECOMMENDATIONS
RN instructed pt to ambulate around room to avoid oxycodone due to ability to slow bowels mylicon given for gas MOM given to help with BM and motrin given for incisional pain. resident aware.

## 2021-06-11 ENCOUNTER — TRANSCRIPTION ENCOUNTER (OUTPATIENT)
Age: 31
End: 2021-06-11

## 2021-06-11 VITALS
SYSTOLIC BLOOD PRESSURE: 106 MMHG | TEMPERATURE: 99 F | DIASTOLIC BLOOD PRESSURE: 71 MMHG | HEART RATE: 89 BPM | RESPIRATION RATE: 18 BRPM | OXYGEN SATURATION: 98 %

## 2021-06-11 PROCEDURE — 59050 FETAL MONITOR W/REPORT: CPT

## 2021-06-11 PROCEDURE — 86769 SARS-COV-2 COVID-19 ANTIBODY: CPT

## 2021-06-11 PROCEDURE — 59025 FETAL NON-STRESS TEST: CPT

## 2021-06-11 PROCEDURE — G0463: CPT

## 2021-06-11 PROCEDURE — 86900 BLOOD TYPING SEROLOGIC ABO: CPT

## 2021-06-11 PROCEDURE — 86850 RBC ANTIBODY SCREEN: CPT

## 2021-06-11 PROCEDURE — 87635 SARS-COV-2 COVID-19 AMP PRB: CPT

## 2021-06-11 PROCEDURE — 86901 BLOOD TYPING SEROLOGIC RH(D): CPT

## 2021-06-11 PROCEDURE — 88307 TISSUE EXAM BY PATHOLOGIST: CPT

## 2021-06-11 PROCEDURE — 85384 FIBRINOGEN ACTIVITY: CPT

## 2021-06-11 PROCEDURE — 85730 THROMBOPLASTIN TIME PARTIAL: CPT

## 2021-06-11 PROCEDURE — 85027 COMPLETE CBC AUTOMATED: CPT

## 2021-06-11 PROCEDURE — 86923 COMPATIBILITY TEST ELECTRIC: CPT

## 2021-06-11 PROCEDURE — C1765: CPT

## 2021-06-11 PROCEDURE — 85025 COMPLETE CBC W/AUTO DIFF WBC: CPT

## 2021-06-11 PROCEDURE — 86780 TREPONEMA PALLIDUM: CPT

## 2021-06-11 PROCEDURE — 85610 PROTHROMBIN TIME: CPT

## 2021-06-11 RX ORDER — SIMETHICONE 80 MG/1
1 TABLET, CHEWABLE ORAL
Qty: 0 | Refills: 0 | DISCHARGE
Start: 2021-06-11

## 2021-06-11 RX ORDER — IBUPROFEN 200 MG
1 TABLET ORAL
Qty: 0 | Refills: 0 | DISCHARGE
Start: 2021-06-11

## 2021-06-11 RX ORDER — LANOLIN
1 OINTMENT (GRAM) TOPICAL
Qty: 0 | Refills: 0 | DISCHARGE
Start: 2021-06-11

## 2021-06-11 RX ORDER — ACETAMINOPHEN 500 MG
3 TABLET ORAL
Qty: 0 | Refills: 0 | DISCHARGE
Start: 2021-06-11

## 2021-06-11 RX ORDER — MAGNESIUM HYDROXIDE 400 MG/1
30 TABLET, CHEWABLE ORAL
Qty: 0 | Refills: 0 | DISCHARGE
Start: 2021-06-11

## 2021-06-11 RX ADMIN — HEPARIN SODIUM 5000 UNIT(S): 5000 INJECTION INTRAVENOUS; SUBCUTANEOUS at 09:40

## 2021-06-11 RX ADMIN — Medication 975 MILLIGRAM(S): at 10:00

## 2021-06-11 RX ADMIN — SIMETHICONE 80 MILLIGRAM(S): 80 TABLET, CHEWABLE ORAL at 02:24

## 2021-06-11 RX ADMIN — Medication 600 MILLIGRAM(S): at 12:01

## 2021-06-11 RX ADMIN — Medication 600 MILLIGRAM(S): at 07:30

## 2021-06-11 RX ADMIN — Medication 600 MILLIGRAM(S): at 06:02

## 2021-06-11 RX ADMIN — Medication 975 MILLIGRAM(S): at 02:25

## 2021-06-11 RX ADMIN — Medication 1 TABLET(S): at 11:56

## 2021-06-11 RX ADMIN — Medication 600 MILLIGRAM(S): at 11:57

## 2021-06-11 RX ADMIN — Medication 975 MILLIGRAM(S): at 02:40

## 2021-06-11 RX ADMIN — Medication 975 MILLIGRAM(S): at 09:25

## 2021-06-11 RX ADMIN — MAGNESIUM HYDROXIDE 30 MILLILITER(S): 400 TABLET, CHEWABLE ORAL at 02:25

## 2021-06-11 NOTE — DISCHARGE NOTE OB - HOSPITAL COURSE
P1 at 33w4d with placenta previa admitted and underwent repeat low transverse  section for significant bleeding and transverse fetal lie. uncomplicated procedure. Baby in NICU for prematurity. Uncomplicated postpartum course.

## 2021-06-11 NOTE — DISCHARGE NOTE OB - PATIENT PORTAL LINK FT
You can access the FollowMyHealth Patient Portal offered by [facility] by registering at the following website: [email or web address]. By joining [employer]’s FollowMyHealth portal, you will also be able to view your health information using other applications (apps) compatible with our system.

## 2021-06-11 NOTE — PROGRESS NOTE ADULT - SUBJECTIVE AND OBJECTIVE BOX
Patient seen and examined at bedside, no acute overnight events. No acute complaints, pain well controlled. Patient is ambulating, voiding spontaneously, passing flatus, and tolerating regular diet. Denies CP, SOB, N/V, HA, blurred vision, epigastric pain.    Vital Signs Last 24 Hours  T(C): 36.6 (06-11-21 @ 05:24), Max: 36.8 (06-10-21 @ 09:43)  HR: 91 (06-11-21 @ 05:24) (86 - 97)  BP: 108/72 (06-11-21 @ 05:24) (90/64 - 114/73)  RR: 18 (06-11-21 @ 05:24) (16 - 18)  SpO2: 99% (06-10-21 @ 22:17) (98% - 99%)    Physical Exam:  General: NAD  Abdomen: Soft, expected tenderness, non-distended, fundus firm  Incision: Pfannenstiel incision CDI, steristrips in place  Pelvic: Lochia wnl    Labs:    Blood Type: A Positive  Antibody Screen: Negative  RPR: Negative               8.7    12.44 )-----------( 285      ( 06-09 @ 06:55 )             27.1                10.3   8.84  )-----------( 325      ( 06-08 @ 14:22 )             31.6                11.0   8.47  )-----------( 265      ( 05-25 @ 20:27 )             34.1         MEDICATIONS  (STANDING):  acetaminophen   Tablet .. 975 milliGRAM(s) Oral <User Schedule>  ascorbic acid 500 milliGRAM(s) Oral three times a day  diphtheria/tetanus/pertussis (acellular) Vaccine (ADAcel) 0.5 milliLiter(s) IntraMuscular once  ferrous    sulfate 325 milliGRAM(s) Oral daily  ferrous    sulfate 325 milliGRAM(s) Oral three times a day  heparin   Injectable 5000 Unit(s) SubCutaneous every 12 hours  ibuprofen  Tablet. 600 milliGRAM(s) Oral every 6 hours  lactated ringers. 1000 milliLiter(s) (125 mL/Hr) IV Continuous <Continuous>  oxytocin Infusion 333.333 milliUNIT(s)/Min (1000 mL/Hr) IV Continuous <Continuous>  oxytocin Infusion 333.333 milliUNIT(s)/Min (1000 mL/Hr) IV Continuous <Continuous>  prenatal multivitamin 1 Tablet(s) Oral daily  psyllium Powder 1 Packet(s) Oral daily    MEDICATIONS  (PRN):  dexAMETHasone  Injectable 4 milliGRAM(s) IV Push every 6 hours PRN Nausea  diphenhydrAMINE 25 milliGRAM(s) Oral every 6 hours PRN Pruritus  lanolin Ointment 1 Application(s) Topical every 6 hours PRN Sore Nipples  magnesium hydroxide Suspension 30 milliLiter(s) Oral two times a day PRN Constipation  naloxone Injectable 0.1 milliGRAM(s) IV Push every 3 minutes PRN For ANY of the following changes in patient status:  A. Breaths Per Minute LESS THAN 10, B. Oxygen saturation LESS THAN 90%, C. Sedation score of 6 for Stop After: 4 Times  ondansetron Injectable 4 milliGRAM(s) IV Push every 6 hours PRN Nausea  oxyCODONE    IR 5 milliGRAM(s) Oral once PRN Moderate to Severe Pain (4-10)  oxyCODONE    IR 5 milliGRAM(s) Oral every 3 hours PRN Moderate to Severe Pain (4-10)  oxyCODONE    IR 5 milliGRAM(s) Oral once PRN Moderate to Severe Pain (4-10)  simethicone 80 milliGRAM(s) Chew every 4 hours PRN Gas

## 2021-06-11 NOTE — DISCHARGE NOTE OB - MEDICATION SUMMARY - MEDICATIONS TO TAKE
I will START or STAY ON the medications listed below when I get home from the hospital:    acetaminophen 325 mg oral tablet  -- 3 tab(s) by mouth   -- Indication: For     ibuprofen 600 mg oral tablet  -- 1 tab(s) by mouth every 6 hours  -- Indication: For     magnesium hydroxide 8% oral suspension  -- 30 milliliter(s) by mouth 2 times a day, As needed, Constipation  -- Indication: For     lanolin topical ointment  -- 1 application on skin every 6 hours, As needed, Sore Nipples  -- Indication: For     Prenatal Multivitamins with Folic Acid 1 mg oral tablet  -- 1 tab(s) by mouth once a day  -- Indication: For     simethicone 80 mg oral tablet, chewable  -- 1 tab(s) by mouth every 4 hours, As needed, Gas  -- Indication: For

## 2021-06-11 NOTE — PROGRESS NOTE ADULT - ASSESSMENT
Pt is a 32yo  POD#2 from urgent [facility] for bleeding placenta previa  doing well postpartum.    - check CBC  - continue with PO analgesia  - increase ambulation  - continue regular diet  - Bowel reg to encourage BM    Sadie Louis, PGY-1
Pt is a 32yo  POD#3 from urgent [facility] for bleeding placenta previa EBL 1000 doing well postpartum.    - Continue with po analgesia  - Increase ambulation  - Continue regular diet  - IV lock  - No labs    Elissa Yusuf,PGY1
Pt is a 32yo  POD1 from urgent [facility] for bleeding placenta previa  doing well postpartum.    - check CBC  - continue with PO analgesia  - increase ambulation  - continue regular diet  - encourage incentive spirometry  - d/c IV fluids    Elissa Yusuf, PGY1

## 2021-06-11 NOTE — DISCHARGE NOTE OB - CARE PROVIDER_API CALL
Roxy Stewart)  Obstetrics and Gynecology  [address] [facility], [address] [place] [address]  Phone: [phone]  Fax: [phone]  Follow Up Time:

## 2021-06-11 NOTE — DISCHARGE NOTE OB - PLAN OF CARE
Take tylenol and motrin as directed, alternating every 3 hours. Take oxycodone as needed for severe pain. Call the office for prescription if needed.   Continue iron and prenatal vitamin daily. Take colace and mylicon as needed for constipation and gas pain.   Try to keep incision dry. Nothing in the vagina and no exercise until 6 week visit.   Follow up in the office in 2 weeks for incision check and 6 weeks for postpartum visit.   Call the office for appointment confirmation and with any concerns, including breast infection, wound infection, postpartum depression, high blood pressure, and painful calves. recovery

## 2021-06-11 NOTE — DISCHARGE NOTE OB - CARE PLAN
Principal Discharge DX:	 delivery delivered  Goal:	recovery  Assessment and plan of treatment:	Take tylenol and motrin as directed, alternating every 3 hours. Take oxycodone as needed for severe pain. Call the office for prescription if needed.   Continue iron and prenatal vitamin daily. Take colace and mylicon as needed for constipation and gas pain.   Try to keep incision dry. Nothing in the vagina and no exercise until 6 week visit.   Follow up in the office in 2 weeks for incision check and 6 weeks for postpartum visit.   Call the office for appointment confirmation and with any concerns, including breast infection, wound infection, postpartum depression, high blood pressure, and painful calves.

## 2021-06-11 NOTE — DISCHARGE NOTE OB - MEDICATION SUMMARY - MEDICATIONS TO STOP TAKING
I will STOP taking the medications listed below when I get home from the hospital:    NIFEdipine 10 mg oral capsule  -- 1 cap(s) by mouth every 6 hours   -- Avoid grapefruit and grapefruit juice while taking this medication.  It is very important that you take or use this exactly as directed.  Do not skip doses or discontinue unless directed by your doctor.  Some non-prescription drugs may aggravate your condition.  Read all labels carefully.  If a warning appears, check with your doctor before taking.

## 2021-06-17 LAB — SURGICAL PATHOLOGY STUDY: SIGNIFICANT CHANGE UP

## 2021-07-16 ENCOUNTER — APPOINTMENT (OUTPATIENT)
Dept: RHEUMATOLOGY | Facility: CLINIC | Age: 31
End: 2021-07-16
Payer: COMMERCIAL

## 2021-07-16 VITALS
OXYGEN SATURATION: 98 % | HEART RATE: 82 BPM | DIASTOLIC BLOOD PRESSURE: 67 MMHG | HEIGHT: 59 IN | SYSTOLIC BLOOD PRESSURE: 97 MMHG | BODY MASS INDEX: 35.28 KG/M2 | TEMPERATURE: 97 F | RESPIRATION RATE: 17 BRPM | WEIGHT: 175 LBS

## 2021-07-16 DIAGNOSIS — Z78.9 OTHER SPECIFIED HEALTH STATUS: ICD-10-CM

## 2021-07-16 DIAGNOSIS — M11.262 OTHER CHONDROCALCINOSIS, LEFT KNEE: ICD-10-CM

## 2021-07-16 PROCEDURE — 99244 OFF/OP CNSLTJ NEW/EST MOD 40: CPT

## 2021-07-19 ENCOUNTER — TRANSCRIPTION ENCOUNTER (OUTPATIENT)
Age: 31
End: 2021-07-19

## 2021-07-19 LAB
25(OH)D3 SERPL-MCNC: 25.5 NG/ML
ALBUMIN SERPL ELPH-MCNC: 4.6 G/DL
ALP BLD-CCNC: 79 U/L
ALT SERPL-CCNC: 38 U/L
ANION GAP SERPL CALC-SCNC: 12 MMOL/L
AST SERPL-CCNC: 23 U/L
BILIRUB SERPL-MCNC: 0.3 MG/DL
BUN SERPL-MCNC: 9 MG/DL
CALCIUM SERPL-MCNC: 9.7 MG/DL
CALCIUM SERPL-MCNC: 9.7 MG/DL
CHLORIDE SERPL-SCNC: 102 MMOL/L
CO2 SERPL-SCNC: 26 MMOL/L
CREAT SERPL-MCNC: 0.8 MG/DL
GLUCOSE SERPL-MCNC: 74 MG/DL
MAGNESIUM SERPL-MCNC: 2.4 MG/DL
PARATHYROID HORMONE INTACT: 35 PG/ML
PHOSPHATE SERPL-MCNC: 4 MG/DL
POTASSIUM SERPL-SCNC: 4.9 MMOL/L
PROT SERPL-MCNC: 7 G/DL
SODIUM SERPL-SCNC: 140 MMOL/L
TSH SERPL-ACNC: 2.02 UIU/ML

## 2021-07-26 NOTE — ED PROVIDER NOTE - OBJECTIVE STATEMENT
Use Enhanced Medication Counseling?: No 32 y/o female presenting with left leg pain and swelling, which started yesterday. Acute onset, moderate severity, localized to the upper thigh, pt took tylenol with mild relief, no associated symptoms. Patient states she was sent by her OBGYN to rule out a DVT. Patient states she is 6 months pregnant, [id number]. Denies any shortness of breath, chest pain, fever, chills, cough, abdominal pain, vaginal leak, vaginal bleeding, or vaginal discharge. 32 y/o female presenting with left leg pain and swelling, which started yesterday. Acute onset, moderate severity, localized to the upper thigh, pt took tylenol with mild relief. Patient states she has also had tingling/numbness at the right leg. Patient states she was sent by her OBGYN to rule out a DVT. Patient states she is 6 months pregnant, [id number]. Denies any shortness of breath, chest pain, fever, chills, cough, abdominal pain, vaginal leak, vaginal bleeding, or vaginal discharge.

## 2021-12-06 NOTE — OB PROVIDER H&P - NS_PARA_OBGYN_ALL_OB_NU
Patient Education        Low Back Pain: Exercises  Introduction  Here are some examples of exercises for you to try. The exercises may be suggested for a condition or for rehabilitation. Start each exercise slowly. Ease off the exercises if you start to have pain. You will be told when to start these exercises and which ones will work best for you. How to do the exercises  Press-up    1. Lie on your stomach, supporting your body with your forearms. 2. Press your elbows down into the floor to raise your upper back. As you do this, relax your stomach muscles and allow your back to arch without using your back muscles. As your press up, do not let your hips or pelvis come off the floor. 3. Hold for 15 to 30 seconds, then relax. 4. Repeat 2 to 4 times. Alternate arm and leg (bird dog) exercise    Do this exercise slowly. Try to keep your body straight at all times, and do not let one hip drop lower than the other. 1. Start on the floor, on your hands and knees. 2. Tighten your belly muscles. 3. Raise one leg off the floor, and hold it straight out behind you. Be careful not to let your hip drop down, because that will twist your trunk. 4. Hold for about 6 seconds, then lower your leg and switch to the other leg. 5. Repeat 8 to 12 times on each leg. 6. Over time, work up to holding for 10 to 30 seconds each time. 7. If you feel stable and secure with your leg raised, try raising the opposite arm straight out in front of you at the same time. Knee-to-chest exercise    1. Lie on your back with your knees bent and your feet flat on the floor. 2. Bring one knee to your chest, keeping the other foot flat on the floor (or keeping the other leg straight, whichever feels better on your lower back). 3. Keep your lower back pressed to the floor. Hold for at least 15 to 30 seconds. 4. Relax, and lower the knee to the starting position. 5. Repeat with the other leg. Repeat 2 to 4 times with each leg.   6. To get more stretch, put your other leg flat on the floor while pulling your knee to your chest.  Curl-ups    1. Lie on the floor on your back with your knees bent at a 90-degree angle. Your feet should be flat on the floor, about 12 inches from your buttocks. 2. Cross your arms over your chest. If this bothers your neck, try putting your hands behind your neck (not your head), with your elbows spread apart. 3. Slowly tighten your belly muscles and raise your shoulder blades off the floor. 4. Keep your head in line with your body, and do not press your chin to your chest.  5. Hold this position for 1 or 2 seconds, then slowly lower yourself back down to the floor. 6. Repeat 8 to 12 times. Pelvic tilt exercise    1. Lie on your back with your knees bent. 2. \"Brace\" your stomach. This means to tighten your muscles by pulling in and imagining your belly button moving toward your spine. You should feel like your back is pressing to the floor and your hips and pelvis are rocking back. 3. Hold for about 6 seconds while you breathe smoothly. 4. Repeat 8 to 12 times. Heel dig bridging    1. Lie on your back with both knees bent and your ankles bent so that only your heels are digging into the floor. Your knees should be bent about 90 degrees. 2. Then push your heels into the floor, squeeze your buttocks, and lift your hips off the floor until your shoulders, hips, and knees are all in a straight line. 3. Hold for about 6 seconds as you continue to breathe normally, and then slowly lower your hips back down to the floor and rest for up to 10 seconds. 4. Do 8 to 12 repetitions. Hamstring stretch in doorway    1. Lie on your back in a doorway, with one leg through the open door. 2. Slide your leg up the wall to straighten your knee. You should feel a gentle stretch down the back of your leg. 3. Hold the stretch for at least 15 to 30 seconds. Do not arch your back, point your toes, or bend either knee.  Keep one heel touching the floor and the other heel touching the wall. 4. Repeat with your other leg. 5. Do 2 to 4 times for each leg. Hip flexor stretch    1. Kneel on the floor with one knee bent and one leg behind you. Place your forward knee over your foot. Keep your other knee touching the floor. 2. Slowly push your hips forward until you feel a stretch in the upper thigh of your rear leg. 3. Hold the stretch for at least 15 to 30 seconds. Repeat with your other leg. 4. Do 2 to 4 times on each side. Wall sit    1. Stand with your back 10 to 12 inches away from a wall. 2. Lean into the wall until your back is flat against it. 3. Slowly slide down until your knees are slightly bent, pressing your lower back into the wall. 4. Hold for about 6 seconds, then slide back up the wall. 5. Repeat 8 to 12 times. Follow-up care is a key part of your treatment and safety. Be sure to make and go to all appointments, and call your doctor if you are having problems. It's also a good idea to know your test results and keep a list of the medicines you take. Where can you learn more? Go to https://[email or web address]. org and sign in to your [employer] account. Enter A198 in the [facility] box to learn more about \"Low Back Pain: Exercises. \"     If you do not have an account, please click on the \"Sign Up Now\" link. Current as of: July 1, 2021               Content Version: 13.0  © 2006-2021 [employer]wise, Incorporated. Care instructions adapted under license by [facility] ([facility]). If you have questions about a medical condition or this instruction, always ask your healthcare professional. [place] [address] any warranty or liability for your use of this information. 1

## 2021-12-19 ENCOUNTER — EMERGENCY (EMERGENCY)
Facility: HOSPITAL | Age: 31
LOS: 1 days | Discharge: HOME | End: 2021-12-19
Attending: EMERGENCY MEDICINE | Admitting: EMERGENCY MEDICINE
Payer: COMMERCIAL

## 2021-12-19 VITALS
OXYGEN SATURATION: 98 % | TEMPERATURE: 98 F | DIASTOLIC BLOOD PRESSURE: 73 MMHG | WEIGHT: 160.06 LBS | SYSTOLIC BLOOD PRESSURE: 123 MMHG | RESPIRATION RATE: 18 BRPM | HEART RATE: 79 BPM | HEIGHT: 58 IN

## 2021-12-19 DIAGNOSIS — Z98.890 OTHER SPECIFIED POSTPROCEDURAL STATES: Chronic | ICD-10-CM

## 2021-12-19 DIAGNOSIS — Z98.891 HISTORY OF UTERINE SCAR FROM PREVIOUS SURGERY: Chronic | ICD-10-CM

## 2021-12-19 LAB
RAPID RVP RESULT: DETECTED
SARS-COV-2 RNA SPEC QL NAA+PROBE: DETECTED

## 2021-12-19 PROCEDURE — 99283 EMERGENCY DEPT VISIT LOW MDM: CPT | Mod: 25

## 2021-12-19 PROCEDURE — 99284 EMERGENCY DEPT VISIT MOD MDM: CPT

## 2021-12-19 PROCEDURE — 0225U NFCT DS DNA&RNA 21 SARSCOV2: CPT

## 2021-12-19 PROCEDURE — 71045 X-RAY EXAM CHEST 1 VIEW: CPT

## 2021-12-19 PROCEDURE — 71045 X-RAY EXAM CHEST 1 VIEW: CPT | Mod: 26

## 2021-12-19 RX ORDER — ACETAMINOPHEN 500 MG
650 TABLET ORAL ONCE
Refills: 0 | Status: COMPLETED | OUTPATIENT
Start: 2021-12-19 | End: 2021-12-19

## 2021-12-19 RX ADMIN — Medication 650 MILLIGRAM(S): at 04:30

## 2021-12-19 RX ADMIN — Medication 650 MILLIGRAM(S): at 04:59

## 2021-12-19 NOTE — ED PROVIDER NOTE - OBJECTIVE STATEMENT
30 yo female no pmhx c/o flu-like symptoms x 1 day with cough, congestion, chills/myalgias, +covid on home test, triple vaccinated with pfizer.  No fever.  No medications taken prior to arrival.  Admits to some sob/chest tightness

## 2021-12-19 NOTE — ED ADULT TRIAGE NOTE - CHIEF COMPLAINT QUOTE
"I had a sudden onset of cold symptoms" nasal congestion, sore throat, chills, body aches, "My ears are clogged"

## 2021-12-19 NOTE — ED PROVIDER NOTE - NSFOLLOWUPINSTRUCTIONS_ED_ALL_ED_FT
1) Self isolate x 10 days  2) Return to Emergency room for any worsening or persistent pain, weakness, fever, or any other concerning symptoms.  3) See attached instruction sheets for additional information, including information regarding signs and symptoms to look out for, reasons to seek immediate care and other important instructions.  4) Tylenol/motrin every 6 hours as needed

## 2021-12-19 NOTE — ED ADULT NURSE NOTE - CHPI ED NUR SYMPTOMS NEG
no abdominal pain/no decreased eating/drinking/no diarrhea/no fever/no headache/no rash/no shortness of breath/no vomiting

## 2021-12-19 NOTE — ED ADULT NURSE REASSESSMENT NOTE - NS ED NURSE REASSESS COMMENT FT1
pt seen and examined by dr. milena jenkins aware of x-ray results. pt d/c to self. verbalized understanding of d/c instructions. left unit in NAD. ambulated unassisted with all belongings

## 2021-12-19 NOTE — ED PROVIDER NOTE - PATIENT PORTAL LINK FT
You can access the FollowMyHealth Patient Portal offered by [facility] by registering at the following website: [email or web address]. By joining [employer]’s FollowMyHealth portal, you will also be able to view your health information using other applications (apps) compatible with our system.

## 2021-12-19 NOTE — ED ADULT NURSE NOTE - OBJECTIVE STATEMENT
pt reports she woke up with sudden onset of chills, body aches, cough, congestion, sore throat and clogged ears. now reports she took "2 COVID tests" at home and both were positive

## 2021-12-19 NOTE — ED PROVIDER NOTE - NS ED ROS FT
Constitutional: - Fever, + Chills, - Anorexia, - Fatigue, - Night sweats  Eyes: - Discharge, - Irritation, - Redness, - Visual changes, - Light sensitivity, - Pain  EARS: - Ear Pain, - Tinnitus, - Decreased hearing  NOSE: + Congestion, - Bloody nose  MOUTH/THROAT: - Vocal Changes, - Drooling, + Sore throat  NECK: - Lumps, - Stiffness, - Pain  CV: - Palpitations, - Chest Pain, - Edema, - Syncope  RESP:  + Cough, - Shortness of Breath, - Dyspnea on Exertion, - Trouble speaking, - Pleuritic pain - Wheezing  GI: - Diarrhea, - Constipation, - Bloody stools, - Nausea, - Vomiting, - Abdominal Pain  : - Dysuria, -Frequency, - Hematuria, - Hesitancy, - Incontinence, - Saddle Anesthesia, - Abnormal discharge  MSK: - Myalgias, - Arthralgias, - Weakness, - Deformities, - Injuries  SKIN: - Color change, - Rash, - Swelling, - Ecchymosis, - Abrasion, - laceration  NEURO: - Change in behavior, - Dec. Alertness, - Headache, - Dizziness, - Change in speech, - Weakness, - Seizure-like activity, - Difficulty ambulating

## 2021-12-19 NOTE — ED PROVIDER NOTE - CLINICAL SUMMARY MEDICAL DECISION MAKING FREE TEXT BOX
+covid, will perform confirmatory swab, base on [facility] updated MAB guideline, pt does not qualify for MAB infusion, XR, tylenol, self isolate x 10 days

## 2021-12-19 NOTE — ED PROVIDER NOTE - PHYSICAL EXAMINATION
Gen: Alert, NAD  Head/eyes: NC/AT, PERRL  ENT: airway patent  Neck: supple, no tenderness/meningismus/JVD, Trachea midline  Pulm/lung: Bilateral clear BS, normal resp effort, no wheeze/stridor/retractions  CV/heart: RRR, no M/R/G, +2 dist pulses (radial, pedal DP/PT, popliteal)  GI/Abd: soft, NT/ND, +BS, no guarding/rebound tenderness  Musculoskeletal: no edema/erythema/cyanosis, FROM in all extremities, no C/T/L spine ttp  Skin: no rash, no vesicles, no petechaie, no ecchymosis, no swelling  Neuro: AAOx3, acting appropriately

## 2022-02-08 NOTE — OB RN TRIAGE NOTE - NS_PARA_OBGYN_ALL_OB_NU
30 minute individual virtual session through [employer]. The pt was pleasant and attentive and engaged in conversational speech. The pt recalled at least 3/4 words on most occasions when given 4 words to categorize into 2 groups. She recalled 4/4 words on 2/6 occasions. Sh recalled 3 words during a word placement task with 69% accuracy. She used rehearsing strategies with minimal verbal cues. Continue POC.     Devon Castañeda M.A., Chris Wells  Speech Pathologist  2/08/2022    [id number]  therapeutic interventions that focus on cognitive function , initial  15 min  [id number]  therapeutic interventions that focus on cognitive function, each additional 15 min 1

## 2022-09-11 ENCOUNTER — EMERGENCY (EMERGENCY)
Facility: HOSPITAL | Age: 32
LOS: 1 days | Discharge: HOME | End: 2022-09-11
Attending: EMERGENCY MEDICINE | Admitting: EMERGENCY MEDICINE
Payer: COMMERCIAL

## 2022-09-11 VITALS
OXYGEN SATURATION: 99 % | TEMPERATURE: 98 F | HEART RATE: 76 BPM | DIASTOLIC BLOOD PRESSURE: 71 MMHG | HEIGHT: 58 IN | RESPIRATION RATE: 16 BRPM | SYSTOLIC BLOOD PRESSURE: 105 MMHG | WEIGHT: 149.91 LBS

## 2022-09-11 DIAGNOSIS — Z98.890 OTHER SPECIFIED POSTPROCEDURAL STATES: Chronic | ICD-10-CM

## 2022-09-11 DIAGNOSIS — Z98.891 HISTORY OF UTERINE SCAR FROM PREVIOUS SURGERY: Chronic | ICD-10-CM

## 2022-09-11 LAB — HCG UR QL: NEGATIVE — SIGNIFICANT CHANGE UP

## 2022-09-11 PROCEDURE — 29130 APPL FINGER SPLINT STATIC: CPT

## 2022-09-11 PROCEDURE — 73140 X-RAY EXAM OF FINGER(S): CPT

## 2022-09-11 PROCEDURE — 99283 EMERGENCY DEPT VISIT LOW MDM: CPT | Mod: 25

## 2022-09-11 PROCEDURE — 73140 X-RAY EXAM OF FINGER(S): CPT | Mod: 26,RT,59

## 2022-09-11 PROCEDURE — 81025 URINE PREGNANCY TEST: CPT

## 2022-09-11 RX ORDER — IBUPROFEN 200 MG
600 TABLET ORAL ONCE
Refills: 0 | Status: COMPLETED | OUTPATIENT
Start: 2022-09-11 | End: 2022-09-11

## 2022-09-11 RX ADMIN — Medication 600 MILLIGRAM(S): at 13:52

## 2022-09-11 RX ADMIN — Medication 600 MILLIGRAM(S): at 14:50

## 2022-09-11 NOTE — ED PROVIDER NOTE - MUSCULOSKELETAL, MLM
R index decreased ROM, tender with swelling and erythema over distal phalanx. distal sensation intact. cap refill <2 secs. Remainder of finger and hands non tender. R index finger decreased ROM, tender with swelling and erythema over distal phalanx. distal sensation intact. cap refill <2 secs. Remainder of fingers and hand non tender.

## 2022-09-11 NOTE — ED ADULT NURSE NOTE - OBJECTIVE STATEMENT
Complaining of pain right 2nd finger, s/p injury. Pt stated the car door slammed on her finger today. Swelling noted, no open area noted. Tender to touch.  Finger mobile, pulses palpable.

## 2022-09-11 NOTE — ED PROVIDER NOTE - PATIENT PORTAL LINK FT
You can access the FollowMyHealth Patient Portal offered by [facility] by registering at the following website: [email or web address]. By joining [employer]’s FollowMyHealth portal, you will also be able to view your health information using other applications (apps) compatible with our system.

## 2022-09-11 NOTE — ED PROVIDER NOTE - CLINICAL SUMMARY MEDICAL DECISION MAKING FREE TEXT BOX
31 yo F presents with R index finger injury. Plan: XRay, Motrin 31 yo F presents with R index finger injury s/p closed card on on finger. Plan: XRay, Motrin 33 yo F presents with R index finger injury s/p closed car door on finger. Plan: XRay, Motrin

## 2022-09-11 NOTE — ED PROVIDER NOTE - OBJECTIVE STATEMENT
31 yo F with PMHx of attention deficit disorder presents to the ED c/o finger injury. Pt states she closed the door on her R index finger. Pain radiates up her hand, but hand was not closed by door.  She is not on any medications. NKDA. Non smoker. No pain medications taken PTA. 33 yo F with PMHx of attention deficit disorder presents to the ED c/o finger injury. Pt states she closed car door on her R index finger. Pain radiates up her hand, but door only closed on index finger.  She is not on any medications. NKDA. Non smoker. No pain medications taken PTA. 33 yo right handed Female with PMHx of attention deficit disorder presents to the ED c/o finger injury. Pt states she closed car door on her R index finger. Pain radiates up her hand, but door only closed on index finger.  She is not on any medications. NKDA. Non smoker. No pain medications taken PTA.

## 2022-09-11 NOTE — ED PROVIDER NOTE - PROVIDER TOKENS
PROVIDER:[TOKEN:[[id number]],FOLLOWUP:[1-3 Days]],PROVIDER:[TOKEN:[[id number]],FOLLOWUP:[1-3 Days]]

## 2022-09-11 NOTE — ED PROVIDER NOTE - CARE PROVIDER_API CALL
Watson Moncada)  Plastic Surgery  [address] [place] [address]  Phone: [phone]  Fax: [phone]  Follow Up Time: 1-3 Days    Pineda Richardson)  Orthopaedic Sports Medicine; Orthopaedic Surgery  [address]  [place] [address]  Phone: [phone]  Fax: [phone]  Follow Up Time: 1-3 Days

## 2022-09-13 ENCOUNTER — APPOINTMENT (OUTPATIENT)
Dept: ORTHOPEDIC SURGERY | Facility: CLINIC | Age: 32
End: 2022-09-13

## 2022-09-13 VITALS — WEIGHT: 150 LBS | HEIGHT: 59 IN | BODY MASS INDEX: 30.24 KG/M2

## 2022-09-13 DIAGNOSIS — S69.91XA UNSPECIFIED INJURY OF RIGHT WRIST, HAND AND FINGER(S), INITIAL ENCOUNTER: ICD-10-CM

## 2022-09-13 PROCEDURE — 99212 OFFICE O/P EST SF 10 MIN: CPT

## 2022-09-13 NOTE — PHYSICAL EXAM
[de-identified] : Right upper extremity:\par Mild swelling and resolving ecchymosis at the index finger, with petechial hemorrhage at the volar pulp.\par Acrylic nail obscures the native nail rendering it impossible to determine if there is a subungual ecchymosis present\par Sensation intact to light touch at the radial and ulnar aspect of the digit after removal of her AlumaFoam splint\par Two-point discrimination is 3 mm in all digits of the right upper extremity\par 2+ radial pulse\par Passively was able to make a full composite fist today in the office\par Active range of motion was limited secondary to pain obtained 70 degrees of MP flexion 70 degrees of PIP flexion and 20 degrees of DIP flexion at the affected index finger

## 2022-09-13 NOTE — ASSESSMENT
[FreeTextEntry1] : 32-year-old female 2 days status post right index finger injury without concomitant fracture who has been doing well with nonoperative treatment consisting of soft tissue rest.  The patient was counseled that she may discontinue the AlumaFoam splint at this time given that there is no fracture nor dislocation.  She was counseled as to the time course of soft tissue injury of the digit and time to expected improvement of her swelling range of motion and numbness.  Her paresthesias had improved in the office with discontinuation of the AlumaFoam splint.\par \par The patient will perform place and hold exercises at the affected digit to improve her range of motion with digital flexion she has already obtained full digit extension\par She does not require further follow-up as long as her symptoms continue to improve\par She may call the office with any questions or concerns

## 2022-09-13 NOTE — REVIEW OF SYSTEMS
[Right] : right [Negative] : Allergic/Immunologic [FreeTextEntry9] : right index finger swelling, pain and ecchymosis

## 2022-09-13 NOTE — HISTORY OF PRESENT ILLNESS
[FreeTextEntry1] : 32 year-old right-hand dominant female presents with R index finger injury sustained on 9/11/22 from shutting her finger in a car door.  She reports that she was distracted when the door was closed on her finger.  Immediate pain, swelling and ecchymosis.  No open lacerations or abrasions.  Had immediate numbness associated with her swelling.  Presented to the [facility] emergency department, where she was evaluated and treated.  She had x-rays performed which were negative for fracture.  She was placed in an AlumaFoam splint for soft tissue rest and instructed to follow-up.  She reports that her pain and swelling has been improving steadily since the time of injury.  She reports that the tingling in her finger worsened after putting on the splint, she has not taken off the splint to see if that was causing the numbness.  She denies any other injury.  She reports that this has not interfered with work as a , sleep, performance of her activities of daily living.  She is here today for the recommended follow-up.\par \par Of note, the patient denies any dislocation or fracture sustained at the time of injury.  She did not require any manual reduction in the emergency department.  Her pain is well controlled without narcotic pain medication.

## 2022-10-06 ENCOUNTER — NON-APPOINTMENT (OUTPATIENT)
Age: 32
End: 2022-10-06

## 2022-10-06 ENCOUNTER — APPOINTMENT (OUTPATIENT)
Dept: OPHTHALMOLOGY | Facility: CLINIC | Age: 32
End: 2022-10-06

## 2022-10-06 PROCEDURE — 92004 COMPRE OPH EXAM NEW PT 1/>: CPT

## 2022-10-06 PROCEDURE — 99072 ADDL SUPL MATRL&STAF TM PHE: CPT

## 2022-10-22 NOTE — OB RN PATIENT PROFILE - PRO HBSAG INFANT
negative Pt here for  twisting his ankle playing volleyball today. Pt has swelling to ankle. No pmh, NKDA

## 2022-11-23 ENCOUNTER — APPOINTMENT (OUTPATIENT)
Dept: OPHTHALMOLOGY | Facility: CLINIC | Age: 32
End: 2022-11-23

## 2022-11-23 ENCOUNTER — NON-APPOINTMENT (OUTPATIENT)
Age: 32
End: 2022-11-23

## 2022-11-23 PROCEDURE — 99072 ADDL SUPL MATRL&STAF TM PHE: CPT

## 2022-11-23 PROCEDURE — 92012 INTRM OPH EXAM EST PATIENT: CPT

## 2023-01-25 ENCOUNTER — APPOINTMENT (OUTPATIENT)
Dept: OPHTHALMOLOGY | Facility: CLINIC | Age: 33
End: 2023-01-25
Payer: SUBSIDIZED

## 2023-01-26 ENCOUNTER — NON-APPOINTMENT (OUTPATIENT)
Age: 33
End: 2023-01-26

## 2023-01-26 PROCEDURE — 92012 INTRM OPH EXAM EST PATIENT: CPT

## 2023-01-26 PROCEDURE — 99072 ADDL SUPL MATRL&STAF TM PHE: CPT

## 2023-04-27 ENCOUNTER — NON-APPOINTMENT (OUTPATIENT)
Age: 33
End: 2023-04-27

## 2023-04-27 ENCOUNTER — APPOINTMENT (OUTPATIENT)
Dept: OPHTHALMOLOGY | Facility: CLINIC | Age: 33
End: 2023-04-27
Payer: SUBSIDIZED

## 2023-04-27 PROCEDURE — 92014 COMPRE OPH EXAM EST PT 1/>: CPT

## 2023-04-27 PROCEDURE — 99072 ADDL SUPL MATRL&STAF TM PHE: CPT

## 2023-07-27 NOTE — CHART NOTE - NSCHARTNOTESELECT_GEN_ALL_CORE
OB Attending
R3 Eval Note
OB Eval Note
[Never] : never
[TextBox_4] : Hx asthma as child\par was good as adult\par but coughs linger and colds stay for a long time by hx\par has 2 dogs\par never smoker\par rare rescue inhaler use\par no maintenance \par heavy snorer\par has active rhinitis\par gets reflux\par not very active\par Cardiology Dr Arboleda

## 2023-09-01 NOTE — OB RN INTRAOPERATIVE NOTE - NS_ADD OB DELIVERY PROCEDURE_OBGYN_ALL_OB
CC:  Shea Vidal is here today for Physical      Medications: medications verified and updated    Refills needed today?  YES    Latex allergy or sensitivity: No known latex allergy     Patient would like communication of their results via:    [facility]    Cell Phone:   Telephone Information:   Mobile [phone]     Okay to leave a message containing results? Yes    Patient's current [facility] status: Active.    Advanced directives: discussed    Care Teams Verified: Updated    Depression Screen: yes    Tobacco history: verified    Health Maintenance Due   Topic Date Due   • Colorectal Cancer Screen-  Never done   • Osteoporosis Screening  Never done   • Pneumococcal Vaccine 65+ (1 - PCV) Never done   • Depression Screening  08/05/2023   • Influenza Vaccine (1) 09/01/2023       Patient is due for topics as listed above but is not proceeding with Immunization(s) Influenza and Pneumococcal, Colorectal Cancer Screening: Colonoscopy and Osteoporosis screening at this time. Patient will discuss with provider.   Click here

## 2023-09-12 NOTE — DISCHARGE NOTE ANTEPARTUM - VAGINAL BLEEDING
Speech-Language Pathology Visit    Visit Type: Daily Treatment Note  Visit: 23  Referring Provider: FERNANDO Li  Medical Diagnosis (from order): Diagnosis Information    Diagnosis  I63.512 (ICD-10-CM) - Stroke due to occlusion of left middle cerebral artery (CMD)        SUBJECTIVE                                                                                                             Patient happy about getting a new phone.       OBJECTIVE                                                                                                                                      Outcome/Assessments  Written expression  Copying 4-6 word sentence: 100% accurate independent     Verbal expression  Reading written sentence out loud: 5/7 independent     Reading 6-8 word sentences out loud (written by clinician): 13/29 independent 28/29 with phonemic/partword cues        ASSESSMENT                                                                                                           Session emphasized expressive language: verbal and written (see data above) Patient continues to increase independence/ accuracy with simple sentences, and demonstrates emerging independence with added length/complexity. Will continue to expand sentences and complexity in upcomming sessions.       Therapy procedure time and total treatment time can be found documented on the Time Entry flowsheet  
Statement Selected

## 2023-09-29 NOTE — PATIENT PROFILE OB - DOES PATIENT HAVE ADVANCE DIRECTIVE
,      39-year-old female 17-week pregnant, outpatient pelvic sonogram shows normal intrauterine pregnancy according to the  who translate , presented with a abdominal cramps and vaginal spotting from yesterday   pelvic exam is unremarkable, abdomen soft and benign    will obtain blood work, type and screen, pelvic sonogram, and reassess ZR No 39-year-old female 17-week pregnant, outpatient pelvic sonogram shows normal intrauterine pregnancy according to the  who translate , presented with a abdominal cramps and vaginal spotting from yesterday   pelvic exam is unremarkable, abdomen soft and benign    will obtain blood work, type and screen, pelvic sonogram, and reassess ZR

## 2023-12-15 NOTE — OB RN PATIENT PROFILE - SUPPORT PERSON NAME
Final Diagnoses:     1. Neck pain    2. Courtney Oh MD, saw and evaluated the patient. All available labs and X-rays were ordered by me or the resident / non-physician and have been reviewed by myself. I discussed the patient with the resident / non-physician and agree with the resident's / non-physician practitioner's findings and plan as documented in the resident's / non-physician practicitioner's note, except where noted. At this point, I agree with the current assessment done in the ED. I was present during key portions of all procedures performed unless otherwise stated. HPI:  NURSING TRIAGE:    This is a 36 y.o. male presenting for evaluation of neck pain. The patient during my interview Perseverating that he it does not "just neck pain" Referring to degenerative disc disease affecting his spine, 3 vertebra that are abnormal, states that the only that helps is a Percocet regimen, ketamine, muscle relaxants and steroids. He became very belligerent when I stated that we would start with anti-inflammatories and feels that I am not taking his pathology seriously. He denies any fevers chills nausea vomiting  He states that maybe a month ago he had pain in his chest somewhere  When I stated we would do symptomatic measures to make him feel better the patient states that "you can get blood work if you want."   I stated that there's nothing that I need to check. Patient became more beligerent and aggressive requesting blood work but unclear for what. States that he can't walk. When I asked if it's true that he walked into the emergency room department as well as walked to the room, he stated that he did and mentions that he walks 9 miles a day. I am unclear about this can't walk thing. He states that when he comes in he gets opiates and he should be on a percocet regimen. No trauma  No incontinence  Denies IVDU  Won't lift the arm.     PMH:   Per chart, meth abuse in past, no use recently. Chief Complaint   Patient presents with    Neck Pain     Reports hx of degenerative disc disease and is experiencing a lot of pain in neck and legs. States that he is feeling dizzy right now. PHYSICAL: ASSESSMENT + PLAN:   Pertinent: upper trapezius tenderness, whole neck tenderness  Non-focal though  No drooling  Normal speech / swallow    General: VS reviewed  Appears in NAD  awake, alert. Well-nourished, well-developed. Appears stated age. Speaking normally in full sentences. Head: Normocephalic, atraumatic  Eyes: EOM-I. No diplopia. No hyphema. No subconjunctival hemorrhages. Symmetrical lids. ENT: Atraumatic external nose and ears. MMM  No malocclusion. No stridor. Normal phonation. No drooling. Normal swallowing. Neck: No JVD. Diffuse neck tenderness  CV: No pallor noted  No tachycardia  Lungs:   No tachypnea  No respiratory distress  Abd: soft nt nd no rebound/guarding  MSK:   FROM spontaneously  Skin: Dry, intact. Neuro: Awake, alert, GCS15, CN II-XII grossly intact. Motor grossly intact. Psychiatric/Behavioral: aggressive   Exam: deferred    Vitals:    12/15/23 1506 12/15/23 1616   BP: (!) 150/102 152/90   BP Location: Right arm    Pulse: 95    Resp: (!) 24    Temp: 98.3 °F (36.8 °C)    TempSrc: Tympanic    SpO2: 99%     Patient repeatedly stating that he is not drug seeking and that I am not taking him seriously, that I don't treat him properly. Reviewing chart. 10/2/23: here for SI, polysubstance abuse. 11/12/23: neck pain, back pain. Toradol/Valium  11/20/23: neck pain, back pain; toradol/valium  11/24/23: neck pain; toradol/valium/percocet; the resident note documents my exact interaction with the patient including beligerent behavior. High suspicion for drug-seeking behavior. There are no obvious limitations to social determinants of care. Nursing note reviewed. Vitals reviewed.    Orders placed by myself and/or advanced practitioner / resident. Previous chart was reviewed  No language barrier. History obtained from patient. There are no limitations to the history obtained:     Past Medical: Past Surgical:    has a past medical history of Asthma, Chronic pain, Hypertension, and Thyroglossal duct cyst.  has a past surgical history that includes Thyroid surgery; Thyroglossal duct excision; and Thyroid surgery. Social: Cardiac (Echo/Cath)   Social History     Substance and Sexual Activity   Alcohol Use Not Currently     Social History     Tobacco Use   Smoking Status Every Day    Current packs/day: 0.50    Types: Cigarettes   Smokeless Tobacco Never   Tobacco Comments    5 cigerettes a day      Social History     Substance and Sexual Activity   Drug Use Not Currently    Types: Marijuana, Methamphetamines    No results found for this or any previous visit. No results found for this or any previous visit. No results found for this or any previous visit.      Labs: Imaging:   Labs Reviewed   CBC AND DIFFERENTIAL - Abnormal       Result Value Ref Range Status    WBC 5.96  4.31 - 10.16 Thousand/uL Final    RBC 4.24  3.88 - 5.62 Million/uL Final    Hemoglobin 13.6  12.0 - 17.0 g/dL Final    Hematocrit 39.1  36.5 - 49.3 % Final    MCV 92  82 - 98 fL Final    MCH 32.1  26.8 - 34.3 pg Final    MCHC 34.8  31.4 - 37.4 g/dL Final    RDW 14.1  11.6 - 15.1 % Final    MPV 9.4  8.9 - 12.7 fL Final    Platelets 065  284 - 390 Thousands/uL Final    nRBC 0  /100 WBCs Final    Neutrophils Relative 50  43 - 75 % Final    Immat GRANS % 0  0 - 2 % Final    Lymphocytes Relative 26  14 - 44 % Final    Monocytes Relative 16 (*) 4 - 12 % Final    Eosinophils Relative 6  0 - 6 % Final    Basophils Relative 2 (*) 0 - 1 % Final    Neutrophils Absolute 3.02  1.85 - 7.62 Thousands/µL Final    Immature Grans Absolute 0.02  0.00 - 0.20 Thousand/uL Final    Lymphocytes Absolute 1.53  0.60 - 4.47 Thousands/µL Final    Monocytes Absolute 0.96  0.17 - 1.22 Thousand/µL Final    Eosinophils Absolute 0.34  0.00 - 0.61 Thousand/µL Final    Basophils Absolute 0.09  0.00 - 0.10 Thousands/µL Final   COMPREHENSIVE METABOLIC PANEL - Abnormal    Sodium 139  135 - 147 mmol/L Final    Potassium 4.2  3.5 - 5.3 mmol/L Final    Chloride 109 (*) 96 - 108 mmol/L Final    CO2 26  21 - 32 mmol/L Final    ANION GAP 4  mmol/L Final    BUN 24  5 - 25 mg/dL Final    Creatinine 0.92  0.60 - 1.30 mg/dL Final    Comment: Standardized to IDMS reference method    Glucose 102  65 - 140 mg/dL Final    Comment: If the patient is fasting, the ADA then defines impaired fasting glucose as > 100 mg/dL and diabetes as > or equal to 123 mg/dL. Calcium 8.8  8.4 - 10.2 mg/dL Final    AST 18  13 - 39 U/L Final    ALT 26  7 - 52 U/L Final    Comment: Specimen collection should occur prior to Sulfasalazine administration due to the potential for falsely depressed results. Alkaline Phosphatase 53  34 - 104 U/L Final    Total Protein 6.4  6.4 - 8.4 g/dL Final    Albumin 4.2  3.5 - 5.0 g/dL Final    Total Bilirubin 0.34  0.20 - 1.00 mg/dL Final    Comment: Use of this assay is not recommended for patients undergoing treatment with eltrombopag due to the potential for falsely elevated results. N-acetyl-p-benzoquinone imine (metabolite of Acetaminophen) will generate erroneously low results in samples for patients that have taken an overdose of Acetaminophen.     eGFR 103  ml/min/1.73sq m Final    Narrative:     [place] guidelines for Chronic Kidney Disease (CKD):     Stage 1 with normal or high GFR (GFR > 90 mL/min/1.73 square meters)    Stage 2 Mild CKD (GFR = 60-89 mL/min/1.73 square meters)    Stage 3A Moderate CKD (GFR = 45-59 mL/min/1.73 square meters)    Stage 3B Moderate CKD (GFR = 30-44 mL/min/1.73 square meters)    Stage 4 Severe CKD (GFR = 15-29 mL/min/1.73 square meters)    Stage 5 End Stage CKD (GFR <15 mL/min/1.73 square meters)  Note: GFR calculation is accurate only with a steady state creatinine   HS TROPONIN I 0HR - Normal    hs TnI 0hr <2  "Refer to ACS Flowchart"- see link ng/L Final    Comment:                                              Initial (time 0) result  If >=50 ng/L, Myocardial injury suggested ;  Type of myocardial injury and treatment strategy  to be determined. If 5-49 ng/L, a delta result at 2 hours and or 4 hours will be needed to further evaluate. If <4 ng/L, and chest pain has been >3 hours since onset, patient may qualify for discharge based on the HEART score in the ED. If <5 ng/L and <3hours since onset of chest pain, a delta result at 2 hours will be needed to further evaluate. HS Troponin 99th Percentile URL of a Health Population=12 ng/L with a 95% Confidence Interval of 8-18 ng/L. Second Troponin (time 2 hours)  If calculated delta >= 20 ng/L,  Myocardial injury suggested ; Type of myocardial injury and treatment strategy to be determined. If 5-49 ng/L and the calculated delta is 5-19 ng/L, consult medical service for evaluation. Continue evaluation for ischemia on ecg and other possible etiology and repeat hs troponin at 4 hours. If delta is <5 ng/L at 2 hours, consider discharge based on risk stratification via the HEART score (if in ED), or BERENICE risk score in IP/Observation. HS Troponin 99th Percentile URL of a Health Population=12 ng/L with a 95% Confidence Interval of 8-18 ng/L.    XR chest 1 view portable   Final Result      No acute cardiopulmonary disease.                   Workstation performed: [id number]            Medications: Code Status:   Medications   oxyCODONE (ROXICODONE) IR tablet 5 mg (5 mg Oral Given 12/15/23 1653)   magnesium sulfate 2 g/50 mL IVPB (premix) 2 g (0 g Intravenous Stopped 12/15/23 1750)   sodium chloride 0.9 % bolus 500 mL (0 mL Intravenous Stopped 12/15/23 1750)   gabapentin (NEURONTIN) capsule 600 mg (600 mg Oral Given 12/15/23 1653)    Code Status: Prior  Advance Directive and Living Will: Power of :    POLST:       Orders Placed This Encounter   Procedures    XR chest 1 view portable    HS Troponin 0hr (reflex protocol)    CBC and differential    Comprehensive metabolic panel    ECG 12 lead    ECG 12 lead     Time reflects when diagnosis was documented in both MDM as applicable and the Disposition within this note       Time User Action Codes Description Comment    12/15/2023  5:37 PM Joshua Melendez Add [M54.2] Neck pain     12/15/2023  5:37 PM Joshua Melendez Add [Q81.50] Radiculopathy           ED Disposition       ED Disposition   Discharge    Condition   Stable    Date/Time   Fri Dec 15, 2023  5:36 PM    Comment   Rustygisselbhavin Chin discharge to home/self care.                    Follow-up Information       Follow up With Specialties Details Why Contact Info Additional Information    [facility] Physical Therapy Call   [phone]          Discharge Medication List as of 12/15/2023  5:38 PM        CONTINUE these medications which have NOT CHANGED    Details   albuterol (PROVENTIL HFA,VENTOLIN HFA) 90 mcg/act inhaler Inhale 2 puffs every 4 (four) hours as needed for wheezing, Starting Fri 9/27/2019, Print      Diclofenac Sodium (VOLTAREN) 1 % Apply 2 g topically 4 (four) times a day for 7 days, Starting Sat 8/6/2022, Until Sat 8/13/2022, Normal      DULoxetine (CYMBALTA) 20 mg capsule Take 1 capsule (20 mg total) by mouth daily, Starting Mon 4/4/2022, Until Wed 5/4/2022, Print      gabapentin (Neurontin) 100 mg capsule Take 1 capsule (100 mg total) by mouth 3 (three) times a day, Starting Sat 8/6/2022, Normal      lidocaine (Lidoderm) 5 % Apply 1 patch topically daily Remove & Discard patch within 12 hours or as directed by MD, Starting Sat 8/6/2022, Normal      !! methocarbamol (ROBAXIN) 500 mg tablet Take 1 tablet (500 mg total) by mouth 2 (two) times a day, Starting Sat 8/6/2022, Normal      !! methocarbamol (ROBAXIN) 500 mg tablet Take 1 tablet (500 mg total) by mouth 2 (two) times a day, Starting Sun 11/12/2023, Normal      naproxen (Naprosyn) 500 mg tablet Take 1 tablet (500 mg total) by mouth 2 (two) times a day with meals, Starting Sun 11/12/2023, Normal      polyethylene glycol (MIRALAX) 17 g packet Take 17 g by mouth daily as needed (Constipation), Starting Wed 2/2/2022, Normal      QUEtiapine (SEROquel) 100 mg tablet Take 1 tablet (100 mg total) by mouth daily at bedtime, Starting Mon 4/4/2022, Until Wed 5/4/2022, Print       !! - Potential duplicate medications found. Please discuss with provider. No discharge procedures on file. Prior to Admission Medications   Prescriptions Last Dose Informant Patient Reported? Taking?    DULoxetine (CYMBALTA) 20 mg capsule   No No   Sig: Take 1 capsule (20 mg total) by mouth daily   Diclofenac Sodium (VOLTAREN) 1 %   No No   Sig: Apply 2 g topically 4 (four) times a day for 7 days   QUEtiapine (SEROquel) 100 mg tablet   No No   Sig: Take 1 tablet (100 mg total) by mouth daily at bedtime   albuterol (PROVENTIL HFA,VENTOLIN HFA) 90 mcg/act inhaler   No No   Sig: Inhale 2 puffs every 4 (four) hours as needed for wheezing   gabapentin (Neurontin) 100 mg capsule   No No   Sig: Take 1 capsule (100 mg total) by mouth 3 (three) times a day   lidocaine (Lidoderm) 5 %   No No   Sig: Apply 1 patch topically daily Remove & Discard patch within 12 hours or as directed by MD   methocarbamol (ROBAXIN) 500 mg tablet   No No   Sig: Take 1 tablet (500 mg total) by mouth 2 (two) times a day   methocarbamol (ROBAXIN) 500 mg tablet   No No   Sig: Take 1 tablet (500 mg total) by mouth 2 (two) times a day   naproxen (Naprosyn) 500 mg tablet   No No   Sig: Take 1 tablet (500 mg total) by mouth 2 (two) times a day with meals   polyethylene glycol (MIRALAX) 17 g packet   No No   Sig: Take 17 g by mouth daily as needed (Constipation)      Facility-Administered Medications: None                        Portions of the record may have been created with voice recognition software. Occasional wrong word or "sound a like" substitutions may have occurred due to the inherent limitations of voice recognition software. Read the chart carefully and recognize, using context, where substitutions have occurred.     Electronically signed by:  Alison Bautista Tristen Dietz

## 2024-11-04 ENCOUNTER — APPOINTMENT (OUTPATIENT)
Dept: ORTHOPEDIC SURGERY | Facility: CLINIC | Age: 34
End: 2024-11-04
Payer: COMMERCIAL

## 2024-11-04 DIAGNOSIS — M79.644 PAIN IN RIGHT FINGER(S): ICD-10-CM

## 2024-11-04 DIAGNOSIS — G56.21 LESION OF ULNAR NERVE, RIGHT UPPER LIMB: ICD-10-CM

## 2024-11-04 PROCEDURE — 73130 X-RAY EXAM OF HAND: CPT | Mod: RT

## 2024-11-04 PROCEDURE — 99204 OFFICE O/P NEW MOD 45 MIN: CPT

## 2024-11-04 PROCEDURE — 73080 X-RAY EXAM OF ELBOW: CPT | Mod: RT

## 2024-11-04 RX ORDER — NAPROXEN 500 MG/1
500 TABLET ORAL
Qty: 60 | Refills: 0 | Status: ACTIVE | COMMUNITY
Start: 2024-11-04 | End: 1900-01-01

## 2024-11-11 ENCOUNTER — APPOINTMENT (OUTPATIENT)
Dept: ORTHOPEDIC SURGERY | Facility: CLINIC | Age: 34
End: 2024-11-11

## 2025-02-20 NOTE — ED ADULT NURSE NOTE - DRUG PRE-SCREENING (DAST -1)
Your child was seen in ED for fevers.    Viral swab was negative.    Blood work showed no acute concerning findings. We discussed with hematology/oncology and they cleared your child for discharge.     Follow up with your primary physician in 1-2 days. If needed call [phone] to find a primary care physician or call  [phone] to schedule an appointment with the general medicine.     Follow up with your hematology/oncologist at your next scheduled appointment, or sooner if your child develops new or worsening symptoms.     Viral Illness in Children    Your child was seen in the Emergency Department and diagnosed with a viral infection.    Viruses are tiny germs that can get into a person's body and cause illness. A virus is the most common cause of illness and fever among children. There are many different types of viruses, and they cause many types of illness, depending on what part of the body is affected. If the virus settles in the nose, throat, and lungs, it causes cough, congestion, and sometimes headache. If it settles in the stomach and intestinal tract, it may cause vomiting and diarrhea. Sometimes it causes vague symptoms of "feeling bad all over," with fussiness, poor appetite, poor sleeping, and lots of crying. A rash may also appear for the first few days, then fade away. Other symptoms can include earache, sore throat, and swollen glands.     A viral illness usually lasts 3 to 5 days, but sometimes it lasts longer, even up to 1 to 2 weeks.  ANTIBIOTICS DON’T HELP.     General tips for taking care of a child who has a viral infection:  -Have your child rest.   -Give your child acetaminophen (Tylenol) and/or ibuprofen (Advil, Motrin) for fever, pain, or fussiness. Read and follow all instructions on the label.   -Be careful when giving your child over-the-counter cold or flu medicines and acetaminophen at the same time. Many of these medicines also contain acetaminophen. Read the labels to make sure that you are not giving your child more than the recommended dose. Too much Tylenol can be harmful.   -Be careful with cough and cold medicines. Don't give them to children younger than 4 years, because they don't work for children that age and can even be harmful. For children 4 years and older, always follow all the instructions carefully. Make sure you know how much medicine to give and how long to use it. And use the dosing device if one is included.   -Attempt to give your child lots of fluids, enough so that the urine is light yellow or clear like water. This is very important if your child is vomiting or has diarrhea. Give your child sips of water or drinks such as Pedialyte. Pedialyte contains a mix of salt, sugar, and minerals. You can buy them at drugstores or grocery stores. Give these drinks as long as your child is throwing up or has diarrhea. Do not use them as the only source of liquids or food for more than 1 to 2 days.   -Keep your child home from school, , or other public places while he or she has a fever.   Follow up with your pediatrician in 1-2 days to make sure that your child is doing better.    Return to the Emergency Department if:  -Your child has symptoms of a viral illness for longer than expected.  Ask your child’s health care provider how long symptoms should last.  -Treatment at home is not controlling your child's symptoms or they are getting worse.  -Your child has signs of needing more fluids. These signs include sunken eyes with few tears, dry mouth with little or no spit, and little or no urine for 8-12 hours.  -Your child who is younger than 2 months has a temperature of 100.4°F (38°C) or higher if not already evaluated for that.  -Your child has trouble breathing.   -Your child has a severe headache or has a stiff neck.
Statement Selected

## 2025-02-21 NOTE — ED ADULT NURSE NOTE - CAS DISCH CONDITION
Detail Level: Simple
Other (Free Text): Pt cleared for treatment by Dr. Amado and is good for another year.
Stable

## 2025-03-06 NOTE — DISCHARGE NOTE ANTEPARTUM - SWOLLEN AREA ON THE LEG THAT IS PAINFUL, RED OR HOT
[FreeTextEntry1] : GSM: C/w twice wkly Vagifem tabs Continue on HT, R/B reviewed  Maternal FHx Cancers: R/B of genetics screening d/w pt Advised pt send her mother's genetics report - if the panel is comprehensive, d/w pt she likely doesn't need testing Pt to situate life insurance before doing blood draw No Paternal hx of inheritable cancers.   RTO in 09/2025 for annual or PRN
Statement Selected

## 2025-07-02 PROBLEM — Z32.01 POSITIVE URINE PREGNANCY TEST: Status: RESOLVED | Noted: 2018-06-28 | Resolved: 2025-07-02

## 2025-09-04 ENCOUNTER — APPOINTMENT (OUTPATIENT)
Dept: ORTHOPEDIC SURGERY | Facility: CLINIC | Age: 35
End: 2025-09-04
Payer: COMMERCIAL

## 2025-09-04 VITALS — WEIGHT: 110 LBS | BODY MASS INDEX: 23.09 KG/M2 | HEIGHT: 58 IN

## 2025-09-04 DIAGNOSIS — M54.12 RADICULOPATHY, CERVICAL REGION: ICD-10-CM

## 2025-09-04 PROCEDURE — 72050 X-RAY EXAM NECK SPINE 4/5VWS: CPT

## 2025-09-04 PROCEDURE — 99204 OFFICE O/P NEW MOD 45 MIN: CPT

## 2025-09-04 RX ORDER — MELOXICAM 15 MG/1
15 TABLET ORAL DAILY
Qty: 28 | Refills: 1 | Status: ACTIVE | COMMUNITY
Start: 2025-09-04 | End: 1900-01-01